# Patient Record
Sex: MALE | Race: WHITE | Employment: FULL TIME | URBAN - NONMETROPOLITAN AREA
[De-identification: names, ages, dates, MRNs, and addresses within clinical notes are randomized per-mention and may not be internally consistent; named-entity substitution may affect disease eponyms.]

---

## 2020-07-13 ENCOUNTER — HOSPITAL ENCOUNTER (OUTPATIENT)
Dept: GENERAL RADIOLOGY | Facility: OTHER | Age: 40
End: 2020-07-13
Attending: FAMILY MEDICINE
Payer: COMMERCIAL

## 2020-07-13 ENCOUNTER — OFFICE VISIT (OUTPATIENT)
Dept: FAMILY MEDICINE | Facility: OTHER | Age: 40
End: 2020-07-13
Attending: FAMILY MEDICINE
Payer: COMMERCIAL

## 2020-07-13 VITALS
HEART RATE: 68 BPM | HEIGHT: 73 IN | TEMPERATURE: 98 F | WEIGHT: 296 LBS | BODY MASS INDEX: 39.23 KG/M2 | RESPIRATION RATE: 16 BRPM | OXYGEN SATURATION: 98 % | DIASTOLIC BLOOD PRESSURE: 88 MMHG | SYSTOLIC BLOOD PRESSURE: 128 MMHG

## 2020-07-13 DIAGNOSIS — S83.241D TEAR OF MEDIAL MENISCUS OF RIGHT KNEE, CURRENT, UNSPECIFIED TEAR TYPE, SUBSEQUENT ENCOUNTER: ICD-10-CM

## 2020-07-13 DIAGNOSIS — M79.672 INTRACTABLE LEFT HEEL PAIN: ICD-10-CM

## 2020-07-13 DIAGNOSIS — G89.29 CHRONIC PAIN OF RIGHT KNEE: Primary | ICD-10-CM

## 2020-07-13 DIAGNOSIS — G89.29 CHRONIC PAIN OF RIGHT KNEE: ICD-10-CM

## 2020-07-13 DIAGNOSIS — M25.561 CHRONIC PAIN OF RIGHT KNEE: Primary | ICD-10-CM

## 2020-07-13 DIAGNOSIS — M25.561 CHRONIC PAIN OF RIGHT KNEE: ICD-10-CM

## 2020-07-13 PROCEDURE — 73560 X-RAY EXAM OF KNEE 1 OR 2: CPT | Mod: LT

## 2020-07-13 PROCEDURE — 73650 X-RAY EXAM OF HEEL: CPT | Mod: LT

## 2020-07-13 PROCEDURE — 99213 OFFICE O/P EST LOW 20 MIN: CPT | Performed by: FAMILY MEDICINE

## 2020-07-13 ASSESSMENT — PAIN SCALES - GENERAL: PAINLEVEL: NO PAIN (0)

## 2020-07-13 ASSESSMENT — MIFFLIN-ST. JEOR: SCORE: 2306.53

## 2020-07-13 NOTE — PROGRESS NOTES
"SUBJECTIVE:  Mik Mckeon is a 39 year old male here for 2 concerns.  First of all developed left heel pain last summer when he was playing softball.  It has never really gotten much better since then.  He generally has pain right away in the morning that will improve as he becomes more active throughout the day.  If he ever has periods of inactivity will stiffen up on him once again.  He describes the pain as being in his posterior calcaneus and is tender to touch.  He tried a stretching regimen for approximate 3 to 4 months last year without any significant improvement.    In regards to his right knee he has had this pain since last October.  He states that he is walking in the woods when he slipped on some rocks and had a twisting action.  He had immediate pain.  The pain seems to bother him most with twisting activities, jumping.  He is generally able to run without any difficulty.  He has tried both ice and anti-inflammatories without any success.    ROS:    As above otherwise ROS is unremarkable.    OBJECTIVE:  /88   Pulse 68   Temp 98  F (36.7  C)   Resp 16   Ht 1.854 m (6' 1\")   Wt 134.3 kg (296 lb)   SpO2 98%   BMI 39.05 kg/m      EXAM:  General Appearance: Pleasant, alert, appropriate appearance for age. No acute distress  Musculoskeletal: Right knee shows range of motion from 0 to 90 degrees with crepitus on extension.  He has medial joint space tenderness.  Medial pain with Almita's.  Normal varus, valgus, anterior drawer and Lockman's.      Left heel shows tenderness to palpation over his Achilles insertion site.  No retrocalcaneal tenderness.  No tenderness over the base of his calcaneus.  Normal range of motion of his ankle.  Skin: No erythema or bruising.  Neurologic Exam: Normal distal sensation to light touch.    ASSESSEMENT AND PLAN:    1. Chronic pain of right knee    2. Intractable left heel pain      X-ray of his right knee and left heel was performed, person reviewed.  His " calcaneus shows a significant bone spur near his Achilles insertion.  Discussed stretches, arch support, anti-inflammatories and icing    In regards to his right knee no significant bony abnormalities.  His medial meniscus for pathology.  Xexam and symptoms are consistent with medial meniscal tear.  We discussed options and at this time we will proceed with an MRI to evaluate his medial meniscus.  He will follow-up afterwards to discuss results.    Priyank Barbour MD    This document was prepared using voice generated software.  While every attempt was made for accuracy, grammatical errors may exist.

## 2020-07-13 NOTE — NURSING NOTE
Patient presents today for right knee and left heel pain.  Medication Reconciliation Complete    Priscilla Andrade LPN  7/13/2020 3:14 PM

## 2020-07-21 ENCOUNTER — HOSPITAL ENCOUNTER (OUTPATIENT)
Dept: MRI IMAGING | Facility: OTHER | Age: 40
Discharge: HOME | End: 2020-07-21
Attending: FAMILY MEDICINE | Admitting: FAMILY MEDICINE
Payer: COMMERCIAL

## 2020-07-21 DIAGNOSIS — M25.561 CHRONIC PAIN OF RIGHT KNEE: ICD-10-CM

## 2020-07-21 DIAGNOSIS — G89.29 CHRONIC PAIN OF RIGHT KNEE: ICD-10-CM

## 2020-07-21 PROCEDURE — 73721 MRI JNT OF LWR EXTRE W/O DYE: CPT | Mod: RT

## 2020-08-05 ENCOUNTER — OFFICE VISIT (OUTPATIENT)
Dept: ORTHOPEDICS | Facility: OTHER | Age: 40
End: 2020-08-05
Attending: FAMILY MEDICINE
Payer: COMMERCIAL

## 2020-08-05 VITALS
HEART RATE: 60 BPM | BODY MASS INDEX: 38.52 KG/M2 | WEIGHT: 292 LBS | DIASTOLIC BLOOD PRESSURE: 92 MMHG | SYSTOLIC BLOOD PRESSURE: 150 MMHG

## 2020-08-05 DIAGNOSIS — S83.241D TEAR OF MEDIAL MENISCUS OF RIGHT KNEE, CURRENT, UNSPECIFIED TEAR TYPE, SUBSEQUENT ENCOUNTER: ICD-10-CM

## 2020-08-05 PROCEDURE — 99202 OFFICE O/P NEW SF 15 MIN: CPT | Performed by: ORTHOPAEDIC SURGERY

## 2020-08-05 NOTE — PROGRESS NOTES
Patient is here for consult on his right knee pain.  Elva Shepard LPN .....................8/5/2020 3:03 PM

## 2020-08-05 NOTE — PROGRESS NOTES
Visit Date:   08/05/2020      REASON FOR EVALUATION:  Right knee pain.      HISTORY:  Mik comes in with regards to his right knee.  He had an injury here while doing some hunting back in October.  It has been painful since that time.  He is a very active gentleman here, but his pain has continued to cause him issues and not let things settle down.  He is here to look at options at this point in time.  He has pain with twisting and rotating, pain along the medial aspect.  Had MRI scan done here recently, which did show a posterior medial meniscal tear plus some underlying arthrosis, but nothing that is beyond mild or approaching moderate at this point in time.  A small popliteal cyst is seen there as well.  He does have some degenerative changes in the back side of the kneecap as well.  As reported, it is worse with activity, a little bit better with rest.      MEDICATIONS:  None.      ALLERGIES:  NO KNOWN DRUG ALLERGIES.      REVIEW OF SYSTEMS:  A 12-point otherwise negative with the exception as stated above.      PHYSICAL EXAMINATION:  The patient is 6 feet 1 inch, 296 pounds.  He is alert and oriented x3, cooperative and pleasant with exam, in no acute distress.  Does ambulate with satisfactory gait.  Affect appropriate.  Examination of patient's right knee shows motion 0-125.  Tenderness across the medial joint line.  Pain with Almita testing.  Stable to varus and valgus.  Lachman testing is negative here as well.  Kneecap tracking is acceptable.  Trace effusion seen in the joint.  Quad strength 5/5.  No skin lesions seen.  No associated hip pain with hip range of motion or straight leg raise otherwise.  MRI scan reviewed does show a small medial meniscus tear in posterior horn with underlying mild arthrosis medial compartment as well as patellofemoral joint.      IMPRESSION:  Right knee symptomatic meniscal tear in posterior horn with underlying arthrosis, mild in nature.      PLAN:  At this time, I advised  the patient to proceed for knee arthroscopy, partial meniscectomy and chondroplasty.  Will look at intervention here in the near future, potentially early September or thereabouts.  I will have schedules here work on a plan for him.  Preoperative exam with primary care physician, Dr. Barbour, and then further treatment as indicated.         JOSE ANGEL CHIU MD             D: 2020   T: 2020   MT: CRISTIAN      Name:     JEREMY NICOLE   MRN:      [id number]        Account:      [id number]   :      1980           Visit Date:   2020      Document: [id number]

## 2020-08-13 PROBLEM — S83.241D TEAR OF MEDIAL MENISCUS OF RIGHT KNEE, CURRENT, UNSPECIFIED TEAR TYPE, SUBSEQUENT ENCOUNTER: Status: ACTIVE | Noted: 2020-08-13

## 2020-08-21 ENCOUNTER — OFFICE VISIT (OUTPATIENT)
Dept: FAMILY MEDICINE | Facility: OTHER | Age: 40
End: 2020-08-21
Attending: PHYSICIAN ASSISTANT
Payer: COMMERCIAL

## 2020-08-21 VITALS
SYSTOLIC BLOOD PRESSURE: 140 MMHG | BODY MASS INDEX: 38.3 KG/M2 | WEIGHT: 289 LBS | HEIGHT: 73 IN | TEMPERATURE: 97.9 F | OXYGEN SATURATION: 96 % | RESPIRATION RATE: 18 BRPM | DIASTOLIC BLOOD PRESSURE: 80 MMHG | HEART RATE: 68 BPM

## 2020-08-21 DIAGNOSIS — M25.561 CHRONIC PAIN OF RIGHT KNEE: ICD-10-CM

## 2020-08-21 DIAGNOSIS — S83.241D TEAR OF MEDIAL MENISCUS OF RIGHT KNEE, CURRENT, UNSPECIFIED TEAR TYPE, SUBSEQUENT ENCOUNTER: Primary | ICD-10-CM

## 2020-08-21 DIAGNOSIS — Z20.822 COVID-19 RULED OUT: ICD-10-CM

## 2020-08-21 DIAGNOSIS — G89.29 CHRONIC PAIN OF RIGHT KNEE: ICD-10-CM

## 2020-08-21 PROCEDURE — 99214 OFFICE O/P EST MOD 30 MIN: CPT | Performed by: PHYSICIAN ASSISTANT

## 2020-08-21 ASSESSMENT — PAIN SCALES - GENERAL: PAINLEVEL: NO PAIN (0)

## 2020-08-21 ASSESSMENT — MIFFLIN-ST. JEOR: SCORE: 2274.78

## 2020-08-21 NOTE — NURSING NOTE
"Chief Complaint   Patient presents with     Pre-Op Exam     right knee scope/ Dr. Brooks/ 09/01/20 / JANE   This patient presents today for a Preoperative exam for this procedure:    Date of Surgery:  09/01/20  Surgeon:  Dr. Brooks  Facility:  [facility]  Fax:  [phone]        Initial BP (!) 140/80 (BP Location: Right arm, Patient Position: Sitting, Cuff Size: Adult Large)   Pulse 68   Temp 97.9  F (36.6  C) (Temporal)   Resp 18   Ht 1.854 m (6' 1\")   Wt 131.1 kg (289 lb)   SpO2 96%   BMI 38.13 kg/m   Estimated body mass index is 38.13 kg/m  as calculated from the following:    Height as of this encounter: 1.854 m (6' 1\").    Weight as of this encounter: 131.1 kg (289 lb).  Medication Reconciliation: complete    Nasrin Waite LPN  "

## 2020-08-21 NOTE — H&P (VIEW-ONLY)
----------------- PREOPERATIVE EXAM ------------------    SUBJECTIVE:   August Mckeon is a 39 year old male here today 8/21/2020 for preoperative optimization due to right knee injury.    Date of Surgery: 9/1/2020  Type of Surgery: Right knee arthroscopy  Surgeon: Dr. Brooks  [facility]: [facility]    HPI: Right knee injury due to hunting in October last fall.  Painful since then. Unchanged since he saw the surgeon on 8/5/2020.  MRI performed on 7/21/2020 personally reviewed and shows tear of the medial meniscus and posterior horn.  Blood pressure slightly elevated the last 2 visits at 140/83 and 150/92 on 8/5/2020.  Suspect these are due to knee pain.    Surgeons most recent HPI personally reviewed and dated 8/5/2020:  Mik comes in with regards to his right knee.  He had an injury here while doing some hunting back in October.  It has been painful since that time.  He is a very active gentleman here, but his pain has continued to cause him issues and not let things settle down.  He is here to look at options at this point in time.  He has pain with twisting and rotating, pain along the medial aspect.  Had MRI scan done here recently, which did show a posterior medial meniscal tear plus some underlying arthrosis, but nothing that is beyond mild or approaching moderate at this point in time.  A small popliteal cyst is seen there as well.  He does have some degenerative changes in the back side of the kneecap as well.  As reported, it is worse with activity, a little bit better with rest.     PREOPERATIVE QUESTIONS/CONSIDERATIONS:  Functional Capacity: Independent   METS: Greater than 10 METS  (> 10 METS = excellent functional capacity)    Anticoagulation medications: No (see below for full list of current medications)    Steroid use for chronic condition > 30 days duration: No    Alcohol or illicit drug use: No    Smoking within 1 year: No    Problems with bleeding or anesthesia: No denies personal or family  history of bleeding tendencies, anesthesia complications, or other problems with surgery.    Obesity: No Body mass index is 38.13 kg/m .    Diabetes: No     Hypertension requiring medication: No, recent elevated readings suspected due to knee pain  BP Readings from Last 3 Encounters:   08/21/20 (!) 140/80   08/05/20 (!) 150/92   07/13/20 128/88       Ascites within 30 days prior to surgery: NO     CHF within 30 days prior to surgery: NO    Dyspnea: NO    History of COPD: NO    Dialysis: NO    Acute Renal Failure: NO     Obstructive sleep apnea: NO    Hypoalbuminemia: NO (ref. 3.4 to 5.4 g/dL)    No results found for: ALBUMIN    Risk for symptomatic anemia: NO     Possibility of Pregnancy: NO    Latex Allergy: NO    Pulmonary Status: NO new or unstable cardiopulmonary signs or symptoms.  Cardiac Status:  The ASCVD Risk score (Carolin CESPEDES Jr., et al., 2013) failed to calculate for the following reasons:    Cannot find a previous HDL lab    Cannot find a previous total cholesterol lab  Revised Cardiac Risk Index - [place]    1 point each for:     History of CVA/TIA NO    CHF NO    Ischemic cardiac disease NO    Renal insufficiency (Cr > 2.0 mg/dL) NO    Diabetes requiring insulin NO    Orthopedic, supra-inguinal vascular, intra-thoracic, or intra-abdominal surgical site YES    TOTOAL:  1 (0 - 1 = low risk; ? 2 = elevated risk)      Do you have a history of heart attack, stroke, stent, bypass or surgery on an artery in the head, neck, heart, or legs?  o No     Do you ever have any pain or discomfort in your chest?  o No     Do you have a history of  Heart Failure?  o No     Are you troubled by shortness of breath when: walking on the level, up a slight hill or at night?  o No     Do you currently have a cold, bronchitis or other respiratory infection?  o No     Do you have a cough, shortness of breath or wheezing?  o No     Do you sometimes get pains in the calves of your legs when you walk?  o No     Do you or anyone in  your family have previous history of blood clots?  o No     Do you or does anyone in your family have a serious bleeding problem such as prolonged bleeding following surgeries or cuts?  o No     Have you ever had problems with anemia or been told to take iron pills?  o No     Have you had any abnormal blood loss such as black, tarry or bloody stools, or abnormal vaginal bleeding?  o No     Have you ever had a blood transfusion?  o No     Have you or any of your relatives ever had problems with anesthesia?  o No     Do you have sleep apnea, excessive snoring or daytime drowsiness?  o No     Do you have any prosthetic heart valves?  o No     Do you have prosthetic joints?  o No     Is there any chance that you may be pregnant?  o No     Do you have a latex allergy?  o No     ACTIVE PROBLEM LIST:  Patient Active Problem List    Diagnosis Date Noted     Tear of medial meniscus of right knee, current, unspecified tear type, subsequent encounter 08/13/2020     Priority: Medium     Added automatically from request for surgery [id number]       S/P shoulder surgery 04/22/2015     Priority: Medium     AC separation 04/16/2015     Priority: Medium       PAST MEDICAL HISTORY:  Past Medical History:   Diagnosis Date     Closed dislocation of acromioclavicular joint     4/16/2015     Other postprocedural states     4/22/2015     Personal history of other medical treatment (CODE)     No Comments Provided       PAST SURGICAL HISTORY:  Past Surgical History:   Procedure Laterality Date     OTHER SURGICAL HISTORY      4/22/2015,[id number],OTHER,Right     OTHER SURGICAL HISTORY      4/22/2015,[id number],OTHER,Right,Arthrex equipment used       MEDICATIONS:  No current outpatient medications on file.       ALLERGIES:  No Known Allergies  Latex Allergy: NO     FAMILY HISTORY:  Family History   Problem Relation Age of Onset     Family History Negative Mother         Good Health     Diabetes Father         Diabetes     Cancer Other          "Cancer,FHx Lung cancer in 3 grandparents.       SOCIAL HISTORY:  Social History     Tobacco Use     Smoking status: Never Smoker     Smokeless tobacco: Never Used   Substance Use Topics     Alcohol use: None     Drug use: Never     Types: Other     Comment: Drug use: Not Asked       REVIEW OF SYSTEMS:  Constitutional: No fever, chills, night sweats, fatigue, or weight changes  Skin: No rashes, itching, bruising, dryness, changes in moles, or changes in hair  HEENT: No changes in vision, changes in hearing/tinnitus, nasal or sinus problems, hoarseness, or dry mouth  Pulm: No dyspnea, cough, wheezing, snoring, sputum, or hemoptysis  CV: No chest pain, palpitations, orthopnea, PND, edema, or claudication  GI: No nausea or vomiting, diarrhea, constipation, abdominal pain, rectal bleeding, dysphagia, or odynophagia  : No dysuria, nocturia, hesitancy, incontinence, or hematuria  Heme: No easy bruising/bleeding, or lymphadenopathy  Endo: No heat/cold intolerance, polyuria/polydipsia, or appetite changes  Musculoskeletal: Pain with twisting and rotating in the medial aspect of the right knee, no muscle pain, myalgias, or back pain  Neuro: No headache, dizziness, hearing loss, weakness, seizures, tremor, numbness, or tingling  Psychiatric: No anxiety, depression, or suicidal/homicidal ideation  Surgical: No previous complications from prior surgeries such as prolonged bleeding, anesthesia complications, dysrhythmias, surgical wound infections, or prolonged hospital stay.    OBJECTIVE:     PHYSICAL EXAM:  Vitals:  Vitals:    08/21/20 0857   BP: (!) 140/80   BP Location: Right arm   Patient Position: Sitting   Cuff Size: Adult Large   Pulse: 68   Resp: 18   Temp: 97.9  F (36.6  C)   TempSrc: Temporal   SpO2: 96%   Weight: 131.1 kg (289 lb)   Height: 1.854 m (6' 1\")       Exam:  General Appearance: Athletic build, Pleasant, alert, appropriate appearance for age. No acute distress  Head: Normal. Normocephalic, " atraumatic.  Eyes: PERRL, EOMI  Ears: Normal TM's bilaterally. Normal auditory canals and external ears.   OroPharynx: Normal buccal mucosa. Normal pharynx.  Neck: Supple, no masses, lymphadenopathy, or thyromegaly.  Pulmonary: Normal chest wall and respirations. Clear to auscultation, no wheezes or crackles.  Cardiovascular: Regular rate and rhythm. S1, S2, no murmurs.  Gastrointestinal: Soft, nontender, no abnormal masses, guarding, or rigidity.  No tympany.  BS normal  Musculoskeletal: Tenderness to palpation of the medial aspect of the joint line of the right knee with trace effusion, pain with Almita testing, varus and valgus testing stable, negative Lockman's, no pain with active or passive range of motion of the hip or ankle.  Skin: no concerning or new rashes.  Neurologic: Normal gait.  Symmetric DTRs, normal gross motor movement, tone, and coordination. No tremor.  Psychiatric: Alert and oriented, appropriate affect.    ASSESSMENT AND PLAN:     1. Tear of medial meniscus of right knee, current, unspecified tear type, subsequent encounter    2. COVID-19 ruled out    3. Chronic pain of right knee        Patient is medically optimized for surgery.    Recent elevated blood pressures suspected due to knee pain.    PRE OP RECOMMENDATIONS:  Patient is on chronic pain medications: no  Patient is on antiplatlet/anticoagulation: no  Other medications that need adjustment perioperatively: no    Patient was advised to call our office and the surgical services with any change in condition or new symptoms if they were to develop between today and their surgical date.  Especially any cardiopulmonary symptoms or symptoms concerning for an infection.    DANO Walls  Family Medicine  [facility]     This document was prepared using voice generated softwear.  While every attempt was made for accuracy, grammatical errors may exist.

## 2020-08-21 NOTE — PROGRESS NOTES
----------------- PREOPERATIVE EXAM ------------------    SUBJECTIVE:   August Mckeon is a 39 year old male here today 8/21/2020 for preoperative optimization due to right knee injury.    Date of Surgery: 9/1/2020  Type of Surgery: Right knee arthroscopy  Surgeon: Dr. Brooks  [facility]: [facility]    HPI: Right knee injury due to hunting in October last fall.  Painful since then. Unchanged since he saw the surgeon on 8/5/2020.  MRI performed on 7/21/2020 personally reviewed and shows tear of the medial meniscus and posterior horn.  Blood pressure slightly elevated the last 2 visits at 140/83 and 150/92 on 8/5/2020.  Suspect these are due to knee pain.    Surgeons most recent HPI personally reviewed and dated 8/5/2020:  Mik comes in with regards to his right knee.  He had an injury here while doing some hunting back in October.  It has been painful since that time.  He is a very active gentleman here, but his pain has continued to cause him issues and not let things settle down.  He is here to look at options at this point in time.  He has pain with twisting and rotating, pain along the medial aspect.  Had MRI scan done here recently, which did show a posterior medial meniscal tear plus some underlying arthrosis, but nothing that is beyond mild or approaching moderate at this point in time.  A small popliteal cyst is seen there as well.  He does have some degenerative changes in the back side of the kneecap as well.  As reported, it is worse with activity, a little bit better with rest.     PREOPERATIVE QUESTIONS/CONSIDERATIONS:  Functional Capacity: Independent   METS: Greater than 10 METS  (> 10 METS = excellent functional capacity)    Anticoagulation medications: No (see below for full list of current medications)    Steroid use for chronic condition > 30 days duration: No    Alcohol or illicit drug use: No    Smoking within 1 year: No    Problems with bleeding or anesthesia: No denies personal or family  history of bleeding tendencies, anesthesia complications, or other problems with surgery.    Obesity: No Body mass index is 38.13 kg/m .    Diabetes: No     Hypertension requiring medication: No, recent elevated readings suspected due to knee pain  BP Readings from Last 3 Encounters:   08/21/20 (!) 140/80   08/05/20 (!) 150/92   07/13/20 128/88       Ascites within 30 days prior to surgery: NO     CHF within 30 days prior to surgery: NO    Dyspnea: NO    History of COPD: NO    Dialysis: NO    Acute Renal Failure: NO     Obstructive sleep apnea: NO    Hypoalbuminemia: NO (ref. 3.4 to 5.4 g/dL)    No results found for: ALBUMIN    Risk for symptomatic anemia: NO     Possibility of Pregnancy: NO    Latex Allergy: NO    Pulmonary Status: NO new or unstable cardiopulmonary signs or symptoms.  Cardiac Status:  The ASCVD Risk score (Carolin CESPEDES Jr., et al., 2013) failed to calculate for the following reasons:    Cannot find a previous HDL lab    Cannot find a previous total cholesterol lab  Revised Cardiac Risk Index - Muir    1 point each for:     History of CVA/TIA NO    CHF NO    Ischemic cardiac disease NO    Renal insufficiency (Cr > 2.0 mg/dL) NO    Diabetes requiring insulin NO    Orthopedic, supra-inguinal vascular, intra-thoracic, or intra-abdominal surgical site YES    TOTOAL:  1 (0 - 1 = low risk; ? 2 = elevated risk)      Do you have a history of heart attack, stroke, stent, bypass or surgery on an artery in the head, neck, heart, or legs?  o No     Do you ever have any pain or discomfort in your chest?  o No     Do you have a history of  Heart Failure?  o No     Are you troubled by shortness of breath when: walking on the level, up a slight hill or at night?  o No     Do you currently have a cold, bronchitis or other respiratory infection?  o No     Do you have a cough, shortness of breath or wheezing?  o No     Do you sometimes get pains in the calves of your legs when you walk?  o No     Do you or anyone in  your family have previous history of blood clots?  o No     Do you or does anyone in your family have a serious bleeding problem such as prolonged bleeding following surgeries or cuts?  o No     Have you ever had problems with anemia or been told to take iron pills?  o No     Have you had any abnormal blood loss such as black, tarry or bloody stools, or abnormal vaginal bleeding?  o No     Have you ever had a blood transfusion?  o No     Have you or any of your relatives ever had problems with anesthesia?  o No     Do you have sleep apnea, excessive snoring or daytime drowsiness?  o No     Do you have any prosthetic heart valves?  o No     Do you have prosthetic joints?  o No     Is there any chance that you may be pregnant?  o No     Do you have a latex allergy?  o No     ACTIVE PROBLEM LIST:  Patient Active Problem List    Diagnosis Date Noted     Tear of medial meniscus of right knee, current, unspecified tear type, subsequent encounter 08/13/2020     Priority: Medium     Added automatically from request for surgery [id number]       S/P shoulder surgery 04/22/2015     Priority: Medium     AC separation 04/16/2015     Priority: Medium       PAST MEDICAL HISTORY:  Past Medical History:   Diagnosis Date     Closed dislocation of acromioclavicular joint     4/16/2015     Other postprocedural states     4/22/2015     Personal history of other medical treatment (CODE)     No Comments Provided       PAST SURGICAL HISTORY:  Past Surgical History:   Procedure Laterality Date     OTHER SURGICAL HISTORY      4/22/2015,[id number],OTHER,Right     OTHER SURGICAL HISTORY      4/22/2015,[id number],OTHER,Right,Arthrex equipment used       MEDICATIONS:  No current outpatient medications on file.       ALLERGIES:  No Known Allergies  Latex Allergy: NO     FAMILY HISTORY:  Family History   Problem Relation Age of Onset     Family History Negative Mother         Good Health     Diabetes Father         Diabetes     Cancer Other          "Cancer,FHx Lung cancer in 3 grandparents.       SOCIAL HISTORY:  Social History     Tobacco Use     Smoking status: Never Smoker     Smokeless tobacco: Never Used   Substance Use Topics     Alcohol use: None     Drug use: Never     Types: Other     Comment: Drug use: Not Asked       REVIEW OF SYSTEMS:  Constitutional: No fever, chills, night sweats, fatigue, or weight changes  Skin: No rashes, itching, bruising, dryness, changes in moles, or changes in hair  HEENT: No changes in vision, changes in hearing/tinnitus, nasal or sinus problems, hoarseness, or dry mouth  Pulm: No dyspnea, cough, wheezing, snoring, sputum, or hemoptysis  CV: No chest pain, palpitations, orthopnea, PND, edema, or claudication  GI: No nausea or vomiting, diarrhea, constipation, abdominal pain, rectal bleeding, dysphagia, or odynophagia  : No dysuria, nocturia, hesitancy, incontinence, or hematuria  Heme: No easy bruising/bleeding, or lymphadenopathy  Endo: No heat/cold intolerance, polyuria/polydipsia, or appetite changes  Musculoskeletal: Pain with twisting and rotating in the medial aspect of the right knee, no muscle pain, myalgias, or back pain  Neuro: No headache, dizziness, hearing loss, weakness, seizures, tremor, numbness, or tingling  Psychiatric: No anxiety, depression, or suicidal/homicidal ideation  Surgical: No previous complications from prior surgeries such as prolonged bleeding, anesthesia complications, dysrhythmias, surgical wound infections, or prolonged hospital stay.    OBJECTIVE:     PHYSICAL EXAM:  Vitals:  Vitals:    08/21/20 0857   BP: (!) 140/80   BP Location: Right arm   Patient Position: Sitting   Cuff Size: Adult Large   Pulse: 68   Resp: 18   Temp: 97.9  F (36.6  C)   TempSrc: Temporal   SpO2: 96%   Weight: 131.1 kg (289 lb)   Height: 1.854 m (6' 1\")       Exam:  General Appearance: Athletic build, Pleasant, alert, appropriate appearance for age. No acute distress  Head: Normal. Normocephalic, " atraumatic.  Eyes: PERRL, EOMI  Ears: Normal TM's bilaterally. Normal auditory canals and external ears.   OroPharynx: Normal buccal mucosa. Normal pharynx.  Neck: Supple, no masses, lymphadenopathy, or thyromegaly.  Pulmonary: Normal chest wall and respirations. Clear to auscultation, no wheezes or crackles.  Cardiovascular: Regular rate and rhythm. S1, S2, no murmurs.  Gastrointestinal: Soft, nontender, no abnormal masses, guarding, or rigidity.  No tympany.  BS normal  Musculoskeletal: Tenderness to palpation of the medial aspect of the joint line of the right knee with trace effusion, pain with Almita testing, varus and valgus testing stable, negative Lockman's, no pain with active or passive range of motion of the hip or ankle.  Skin: no concerning or new rashes.  Neurologic: Normal gait.  Symmetric DTRs, normal gross motor movement, tone, and coordination. No tremor.  Psychiatric: Alert and oriented, appropriate affect.    ASSESSMENT AND PLAN:     1. Tear of medial meniscus of right knee, current, unspecified tear type, subsequent encounter    2. COVID-19 ruled out    3. Chronic pain of right knee        Patient is medically optimized for surgery.    Recent elevated blood pressures suspected due to knee pain.    PRE OP RECOMMENDATIONS:  Patient is on chronic pain medications: no  Patient is on antiplatlet/anticoagulation: no  Other medications that need adjustment perioperatively: no    Patient was advised to call our office and the surgical services with any change in condition or new symptoms if they were to develop between today and their surgical date.  Especially any cardiopulmonary symptoms or symptoms concerning for an infection.    DANO Walls  Family Medicine  [facility]     This document was prepared using voice generated softwear.  While every attempt was made for accuracy, grammatical errors may exist.

## 2020-08-29 ENCOUNTER — ALLIED HEALTH/NURSE VISIT (OUTPATIENT)
Dept: FAMILY MEDICINE | Facility: OTHER | Age: 40
End: 2020-08-29
Attending: ORTHOPAEDIC SURGERY
Payer: COMMERCIAL

## 2020-08-29 DIAGNOSIS — Z20.822 COVID-19 RULED OUT: ICD-10-CM

## 2020-08-29 PROCEDURE — 99207 ZZC NO CHARGE NURSE ONLY: CPT

## 2020-08-29 PROCEDURE — U0003 INFECTIOUS AGENT DETECTION BY NUCLEIC ACID (DNA OR RNA); SEVERE ACUTE RESPIRATORY SYNDROME CORONAVIRUS 2 (SARS-COV-2) (CORONAVIRUS DISEASE [COVID-19]), AMPLIFIED PROBE TECHNIQUE, MAKING USE OF HIGH THROUGHPUT TECHNOLOGIES AS DESCRIBED BY CMS-2020-01-R: HCPCS | Mod: ZL | Performed by: PHYSICIAN ASSISTANT

## 2020-08-29 PROCEDURE — C9803 HOPD COVID-19 SPEC COLLECT: HCPCS

## 2020-08-30 LAB
SARS-COV-2 RNA SPEC QL NAA+PROBE: NOT DETECTED
SPECIMEN SOURCE: NORMAL

## 2020-08-31 ENCOUNTER — ANESTHESIA EVENT (OUTPATIENT)
Dept: SURGERY | Facility: OTHER | Age: 40
End: 2020-08-31
Payer: COMMERCIAL

## 2020-09-01 ENCOUNTER — HOSPITAL ENCOUNTER (OUTPATIENT)
Facility: OTHER | Age: 40
Discharge: HOME | End: 2020-09-01
Attending: ORTHOPAEDIC SURGERY | Admitting: ORTHOPAEDIC SURGERY
Payer: COMMERCIAL

## 2020-09-01 ENCOUNTER — ANESTHESIA (OUTPATIENT)
Dept: SURGERY | Facility: OTHER | Age: 40
End: 2020-09-01
Payer: COMMERCIAL

## 2020-09-01 VITALS
OXYGEN SATURATION: 98 % | SYSTOLIC BLOOD PRESSURE: 132 MMHG | HEART RATE: 54 BPM | DIASTOLIC BLOOD PRESSURE: 81 MMHG | RESPIRATION RATE: 14 BRPM | WEIGHT: 289 LBS | BODY MASS INDEX: 38.3 KG/M2 | HEIGHT: 73 IN | TEMPERATURE: 97.1 F

## 2020-09-01 DIAGNOSIS — S83.241D TEAR OF MEDIAL MENISCUS OF RIGHT KNEE, CURRENT, UNSPECIFIED TEAR TYPE, SUBSEQUENT ENCOUNTER: ICD-10-CM

## 2020-09-01 PROCEDURE — 29881 ARTHRS KNE SRG MNISECTMY M/L: CPT | Performed by: NURSE ANESTHETIST, CERTIFIED REGISTERED

## 2020-09-01 PROCEDURE — 71000014 ZZH RECOVERY PHASE 1 LEVEL 2 FIRST HR: Performed by: ORTHOPAEDIC SURGERY

## 2020-09-01 PROCEDURE — 37000008 ZZH ANESTHESIA TECHNICAL FEE, 1ST 30 MIN: Performed by: ORTHOPAEDIC SURGERY

## 2020-09-01 PROCEDURE — 29875 ARTHRS KNEE SURG SYNVCT LMTD: CPT | Mod: XS | Performed by: ORTHOPAEDIC SURGERY

## 2020-09-01 PROCEDURE — 25800025 ZZH RX 258: Performed by: ORTHOPAEDIC SURGERY

## 2020-09-01 PROCEDURE — 36000056 ZZH SURGERY LEVEL 3 1ST 30 MIN: Performed by: ORTHOPAEDIC SURGERY

## 2020-09-01 PROCEDURE — 71000027 ZZH RECOVERY PHASE 2 EACH 15 MINS: Performed by: ORTHOPAEDIC SURGERY

## 2020-09-01 PROCEDURE — 29881 ARTHRS KNE SRG MNISECTMY M/L: CPT | Mod: RT | Performed by: ORTHOPAEDIC SURGERY

## 2020-09-01 PROCEDURE — 25000128 H RX IP 250 OP 636: Performed by: ORTHOPAEDIC SURGERY

## 2020-09-01 PROCEDURE — 37000009 ZZH ANESTHESIA TECHNICAL FEE, EACH ADDTL 15 MIN: Performed by: ORTHOPAEDIC SURGERY

## 2020-09-01 PROCEDURE — 40000306 ZZH STATISTIC PRE PROC ASSESS II: Performed by: ORTHOPAEDIC SURGERY

## 2020-09-01 PROCEDURE — 25800030 ZZH RX IP 258 OP 636: Performed by: NURSE ANESTHETIST, CERTIFIED REGISTERED

## 2020-09-01 PROCEDURE — 36000058 ZZH SURGERY LEVEL 3 EA 15 ADDTL MIN: Performed by: ORTHOPAEDIC SURGERY

## 2020-09-01 PROCEDURE — 25000128 H RX IP 250 OP 636: Performed by: NURSE ANESTHETIST, CERTIFIED REGISTERED

## 2020-09-01 PROCEDURE — 27210794 ZZH OR GENERAL SUPPLY STERILE: Performed by: ORTHOPAEDIC SURGERY

## 2020-09-01 PROCEDURE — 25000125 ZZHC RX 250: Performed by: NURSE ANESTHETIST, CERTIFIED REGISTERED

## 2020-09-01 RX ORDER — ONDANSETRON 2 MG/ML
INJECTION INTRAMUSCULAR; INTRAVENOUS PRN
Status: DISCONTINUED | OUTPATIENT
Start: 2020-09-01 | End: 2020-09-01

## 2020-09-01 RX ORDER — MEPERIDINE HYDROCHLORIDE 50 MG/ML
12.5 INJECTION INTRAMUSCULAR; INTRAVENOUS; SUBCUTANEOUS
Status: DISCONTINUED | OUTPATIENT
Start: 2020-09-01 | End: 2020-09-01 | Stop reason: HOSPADM

## 2020-09-01 RX ORDER — FENTANYL CITRATE 50 UG/ML
25-50 INJECTION, SOLUTION INTRAMUSCULAR; INTRAVENOUS EVERY 5 MIN PRN
Status: DISCONTINUED | OUTPATIENT
Start: 2020-09-01 | End: 2020-09-01 | Stop reason: HOSPADM

## 2020-09-01 RX ORDER — LIDOCAINE 40 MG/G
CREAM TOPICAL
Status: DISCONTINUED | OUTPATIENT
Start: 2020-09-01 | End: 2020-09-01 | Stop reason: HOSPADM

## 2020-09-01 RX ORDER — CEFAZOLIN SODIUM 1 G/3ML
1 INJECTION, POWDER, FOR SOLUTION INTRAMUSCULAR; INTRAVENOUS SEE ADMIN INSTRUCTIONS
Status: DISCONTINUED | OUTPATIENT
Start: 2020-09-01 | End: 2020-09-01 | Stop reason: HOSPADM

## 2020-09-01 RX ORDER — ONDANSETRON 2 MG/ML
4 INJECTION INTRAMUSCULAR; INTRAVENOUS EVERY 30 MIN PRN
Status: DISCONTINUED | OUTPATIENT
Start: 2020-09-01 | End: 2020-09-01 | Stop reason: HOSPADM

## 2020-09-01 RX ORDER — HYDROCODONE BITARTRATE AND ACETAMINOPHEN 5; 325 MG/1; MG/1
1 TABLET ORAL EVERY 6 HOURS PRN
Status: DISCONTINUED | OUTPATIENT
Start: 2020-09-01 | End: 2020-09-01 | Stop reason: HOSPADM

## 2020-09-01 RX ORDER — LIDOCAINE HYDROCHLORIDE 20 MG/ML
INJECTION, SOLUTION INFILTRATION; PERINEURAL PRN
Status: DISCONTINUED | OUTPATIENT
Start: 2020-09-01 | End: 2020-09-01

## 2020-09-01 RX ORDER — FENTANYL CITRATE 50 UG/ML
INJECTION, SOLUTION INTRAMUSCULAR; INTRAVENOUS PRN
Status: DISCONTINUED | OUTPATIENT
Start: 2020-09-01 | End: 2020-09-01

## 2020-09-01 RX ORDER — HYDROMORPHONE HYDROCHLORIDE 1 MG/ML
.3-.5 INJECTION, SOLUTION INTRAMUSCULAR; INTRAVENOUS; SUBCUTANEOUS EVERY 10 MIN PRN
Status: DISCONTINUED | OUTPATIENT
Start: 2020-09-01 | End: 2020-09-01 | Stop reason: HOSPADM

## 2020-09-01 RX ORDER — PROPOFOL 10 MG/ML
INJECTION, EMULSION INTRAVENOUS PRN
Status: DISCONTINUED | OUTPATIENT
Start: 2020-09-01 | End: 2020-09-01

## 2020-09-01 RX ORDER — BUPIVACAINE HYDROCHLORIDE 2.5 MG/ML
INJECTION, SOLUTION EPIDURAL; INFILTRATION; INTRACAUDAL PRN
Status: DISCONTINUED | OUTPATIENT
Start: 2020-09-01 | End: 2020-09-01 | Stop reason: HOSPADM

## 2020-09-01 RX ORDER — DEXAMETHASONE SODIUM PHOSPHATE 4 MG/ML
INJECTION, SOLUTION INTRA-ARTICULAR; INTRALESIONAL; INTRAMUSCULAR; INTRAVENOUS; SOFT TISSUE PRN
Status: DISCONTINUED | OUTPATIENT
Start: 2020-09-01 | End: 2020-09-01

## 2020-09-01 RX ORDER — SODIUM CHLORIDE, SODIUM LACTATE, POTASSIUM CHLORIDE, CALCIUM CHLORIDE 600; 310; 30; 20 MG/100ML; MG/100ML; MG/100ML; MG/100ML
INJECTION, SOLUTION INTRAVENOUS CONTINUOUS
Status: DISCONTINUED | OUTPATIENT
Start: 2020-09-01 | End: 2020-09-01 | Stop reason: HOSPADM

## 2020-09-01 RX ORDER — CEFAZOLIN SODIUM IN 0.9 % NACL 3 G/100 ML
3 INTRAVENOUS SOLUTION, PIGGYBACK (ML) INTRAVENOUS
Status: COMPLETED | OUTPATIENT
Start: 2020-09-01 | End: 2020-09-01

## 2020-09-01 RX ORDER — KETOROLAC TROMETHAMINE 30 MG/ML
INJECTION, SOLUTION INTRAMUSCULAR; INTRAVENOUS PRN
Status: DISCONTINUED | OUTPATIENT
Start: 2020-09-01 | End: 2020-09-01

## 2020-09-01 RX ORDER — NALOXONE HYDROCHLORIDE 0.4 MG/ML
.1-.4 INJECTION, SOLUTION INTRAMUSCULAR; INTRAVENOUS; SUBCUTANEOUS
Status: DISCONTINUED | OUTPATIENT
Start: 2020-09-01 | End: 2020-09-01 | Stop reason: HOSPADM

## 2020-09-01 RX ORDER — HYDROCODONE BITARTRATE AND ACETAMINOPHEN 5; 325 MG/1; MG/1
1 TABLET ORAL EVERY 6 HOURS PRN
Qty: 10 TABLET | Refills: 0 | Status: SHIPPED | OUTPATIENT
Start: 2020-09-01 | End: 2020-09-04

## 2020-09-01 RX ORDER — ONDANSETRON 4 MG/1
4 TABLET, ORALLY DISINTEGRATING ORAL EVERY 30 MIN PRN
Status: DISCONTINUED | OUTPATIENT
Start: 2020-09-01 | End: 2020-09-01 | Stop reason: HOSPADM

## 2020-09-01 RX ORDER — KETAMINE HYDROCHLORIDE 10 MG/ML
INJECTION INTRAMUSCULAR; INTRAVENOUS PRN
Status: DISCONTINUED | OUTPATIENT
Start: 2020-09-01 | End: 2020-09-01

## 2020-09-01 RX ORDER — PROPOFOL 10 MG/ML
INJECTION, EMULSION INTRAVENOUS CONTINUOUS PRN
Status: DISCONTINUED | OUTPATIENT
Start: 2020-09-01 | End: 2020-09-01

## 2020-09-01 RX ADMIN — DEXAMETHASONE SODIUM PHOSPHATE 8 MG: 4 INJECTION, SOLUTION INTRA-ARTICULAR; INTRALESIONAL; INTRAMUSCULAR; INTRAVENOUS; SOFT TISSUE at 09:14

## 2020-09-01 RX ADMIN — ONDANSETRON 4 MG: 2 INJECTION INTRAMUSCULAR; INTRAVENOUS at 09:01

## 2020-09-01 RX ADMIN — PROPOFOL 200 MCG/KG/MIN: 10 INJECTION, EMULSION INTRAVENOUS at 09:02

## 2020-09-01 RX ADMIN — PROPOFOL 200 MG: 10 INJECTION, EMULSION INTRAVENOUS at 09:01

## 2020-09-01 RX ADMIN — KETOROLAC TROMETHAMINE 30 MG: 30 INJECTION, SOLUTION INTRAMUSCULAR at 09:37

## 2020-09-01 RX ADMIN — FENTANYL CITRATE 25 MCG: 50 INJECTION, SOLUTION INTRAMUSCULAR; INTRAVENOUS at 09:35

## 2020-09-01 RX ADMIN — FENTANYL CITRATE 25 MCG: 50 INJECTION, SOLUTION INTRAMUSCULAR; INTRAVENOUS at 09:38

## 2020-09-01 RX ADMIN — Medication 50 MG: at 09:06

## 2020-09-01 RX ADMIN — SODIUM CHLORIDE, POTASSIUM CHLORIDE, SODIUM LACTATE AND CALCIUM CHLORIDE: 600; 310; 30; 20 INJECTION, SOLUTION INTRAVENOUS at 07:41

## 2020-09-01 RX ADMIN — FENTANYL CITRATE 25 MCG: 50 INJECTION, SOLUTION INTRAMUSCULAR; INTRAVENOUS at 09:25

## 2020-09-01 RX ADMIN — Medication 3 G: at 08:53

## 2020-09-01 RX ADMIN — LIDOCAINE HYDROCHLORIDE 80 MG: 20 INJECTION, SOLUTION INFILTRATION; PERINEURAL at 09:01

## 2020-09-01 RX ADMIN — MIDAZOLAM 2 MG: 1 INJECTION INTRAMUSCULAR; INTRAVENOUS at 08:52

## 2020-09-01 RX ADMIN — FENTANYL CITRATE 25 MCG: 50 INJECTION, SOLUTION INTRAMUSCULAR; INTRAVENOUS at 09:08

## 2020-09-01 ASSESSMENT — MIFFLIN-ST. JEOR: SCORE: 2274.65

## 2020-09-01 NOTE — ANESTHESIA CARE TRANSFER NOTE
Patient: August Mckeon    Procedure(s):  MENISCECTOMY, KNEE, ARTHROSCOPIC    Diagnosis: Tear of medial meniscus of right knee, current, unspecified tear type, subsequent encounter [Z24.721J]  Diagnosis Additional Information: No value filed.    Anesthesia Type:   General     Note:  Airway :Face Mask  Patient transferred to:PACU  Handoff Report: Identifed the Patient, Identified the Reponsible Provider, Reviewed the pertinent medical history, Discussed the surgical course, Reviewed Intra-OP anesthesia mangement and issues during anesthesia, Set expectations for post-procedure period and Allowed opportunity for questions and acknowledgement of understanding      Vitals: (Last set prior to Anesthesia Care Transfer)    CRNA VITALS  9/1/2020 0921 - 9/1/2020 0954      9/1/2020             Resp Rate (observed):  (!) 2                Electronically Signed By: LIANNA SMITH CRNA  September 1, 2020  9:54 AM

## 2020-09-01 NOTE — INTERVAL H&P NOTE
Brief History of Illness:   August Mckeon is a 39 year old male who was admitted for right knee pain    H&P reviewed and patient examined with no new updates from prior exam

## 2020-09-01 NOTE — ANESTHESIA PROCEDURE NOTES
Airway   Date/Time: 9/1/2020 9:03 AM   Patient location during procedure: OR    General Information and Staff   Performed: CRNA         Indications and Patient Condition  Indications for airway management: carter-procedural  Induction type:intravenous  Mask difficulty assessment: easy    Final Airway Details  Final airway type: supraglottic airway  Successful airway:LMA  Supraglottic airway: Size 5    Number of attempts at approach: 1  Number of other approaches attempted: 0      Ease of procedure: easy

## 2020-09-01 NOTE — OP NOTE
Procedure Date: 09/01/2020      PREOPERATIVE DIAGNOSIS:  Right knee displaced medial meniscus tear.      POSTOPERATIVE DIAGNOSES:     1.  Right knee displaced medial meniscus tear.   2.  Plica band.      PROCEDURES PERFORMED:   1.  Right knee arthroscopy with partial medial meniscectomy.   2.  Plica band resection.      ASSISTANT:  Peter Garcia PA-C      ANESTHESIA:  LMA.      COMPLICATIONS:  Without.      INDICATIONS FOR PROCEDURE:  Mr. Mckeon is a 39-year-old gentleman with history of right knee pain, progressive in nature, unresponsive to conservative measures.  Recommendation was for knee arthroscopy, partial meniscectomy and indicated procedure.  The patient did elect to proceed following a discussion of the risks and benefits.      DESCRIPTION OF PROCEDURE:  The patient was taken to the operative suite, administered anesthesia.  Following induction, placed in the standard arthroscopy position supine in nature.  Right lower extremity prepped and draped in normal sterile fashion.  Preoperative antibiotics administered.  Timeout was called.  At this point in time, anteromedial and anterolateral portals established.  Diagnostic arthroscopy was performed.  Medial compartment demonstrated posteromedial meniscus tear, flap-like in nature.  Meniscal biters as well as a shaver were used to trim back to smooth and contour margins, roughly 25% resected.  After completion of resection, inspected the cartilage, really no significant chondromalacia identified in the medial compartment.  ACL was intact.  Lateral compartment demonstrated no evidence of disease.  Patellofemoral joint did show some grade 2 chondromalacia.  We did a chondroplasty there with the arthroscopic shaver.  We inspected the medial and lateral gutters.  The medial gutter did show a very large plica band.  As such, we did resect this back to smooth margins as well.  We did a final inspection on the medial side and did not see any other remaining issues  there.  As such, the scope was withdrawn.  The portals were closed with nylon in interrupted fashion, followed by application of a soft postsurgical dressing.  The patient was then extubated and transferred to recovery room in stable condition.      POSTOPERATIVE PLAN:  Sutures out in 7-10 days.  Weightbear as tolerated.  Ice, elevate, quad exercise regimen.  Norco for pain management.      A skilled first assistant was necessary for position, intraoperative decision making, retraction and exposure during the procedure.      Furthermore, a skilled first assistant was necessary to complete the procedure in a technically safe and efficient manner.         JOSE ANGEL CHIU MD             D: 2020   T: 2020   MT: LAXMI      Name:     JOSE ANGEL NICOLE   MRN:      [id number]        Account:        [id number]   :      1980           Procedure Date: 2020      Document: [id number]

## 2020-09-01 NOTE — BRIEF OP NOTE
[facility]    Brief Operative Note    Pre-operative diagnosis: Tear of medial meniscus of right knee, current, unspecified tear type, subsequent encounter [C72.315B]  Post-operative diagnosis Same as pre-operative diagnosis    Procedure: Procedure(s):  MENISCECTOMY, KNEE, ARTHROSCOPIC  Surgeon: Surgeon(s) and Role:     * August Brooks MD - Primary     * Peter Garcia PA-C - Assisting  Anesthesia: General   Estimated blood loss: Minimal  Drains: None  Specimens: * No specimens in log *  Findings:   None.  Complications: None.  Implants: * No implants in log *

## 2020-09-01 NOTE — DISCHARGE INSTRUCTIONS
[facility]  Adult Discharge Orders & Instructions      For 24 hours after surgery:  1. Get plenty of rest.  A responsible adult must stay with you for at least 24 hours after you leave the hospital.   2. You may feel lightheaded.  IF so, sit for a few minutes before standing.  Have someone help you get up.   3. You may have a slight fever. Call the doctor if your fever is over 101 F (38.3 C) (taken under the tongue) or lasts longer than 24 hours.  4. You may have a dry mouth, a sore throat, muscle aches or trouble sleeping.  These should go away after 24 hours.  5. Do not make important or legal decisions.  6.   Do not drive or use heavy equipment.  If you have weakness or tingling, don't drive or use heavy equipment until this feeling goes away.                                                                                                                                                                           Surgeon Contact Information  If you have questions or concerns related to your procedure please contact your surgeon through [facility] at [phone].      Ice elevate RLE  Quad exercises  Crutche ambulation for one day then WBAT  May remove dressing POD#3 and apply bandaids, ok to shower POD#3  Followup 7-10 days at [facility]  Call [phone] for question postop

## 2020-09-01 NOTE — ANESTHESIA POSTPROCEDURE EVALUATION
Patient: August Mckeon    Procedure(s):  MENISCECTOMY, KNEE, ARTHROSCOPIC    Diagnosis:Tear of medial meniscus of right knee, current, unspecified tear type, subsequent encounter [F24.976Q]  Diagnosis Additional Information: No value filed.    Anesthesia Type:  General    Note:  Anesthesia Post Evaluation    Patient location during evaluation: Phase 2  Patient participation: Able to fully participate in evaluation  Level of consciousness: awake and alert  Pain management: adequate  Airway patency: patent  Cardiovascular status: acceptable  Respiratory status: acceptable  Hydration status: acceptable  PONV: none             Last vitals:  Vitals:    09/01/20 1010 09/01/20 1013 09/01/20 1014   BP: 121/74  120/77   Pulse: 58  56   Resp: 11  16   Temp: 97.1  F (36.2  C)     SpO2: 93% 93% 93%         Electronically Signed By: David Kellerman, APRN CRNA  September 1, 2020  11:42 AM

## 2020-09-01 NOTE — OR NURSING
PACU Respiratory Event Documentation     1) Episodes of Apnea greater than or equal to 10 seconds: no    2) Bradypnea - less than 8 breaths per minute: no    3) Pain score on 0 to 10 scale: denies    4) Pain-sedation mismatch (yes or no): no    5) Repeated 02 desaturation less than 90% (yes or no): no    Anesthesia notified? (yes or no): no    Any of the above events occuring repeatedly in separate 30 minute intervals may be considered recurrent PACU respiratory events.    Angella Johnson RN

## 2020-09-01 NOTE — ANESTHESIA PREPROCEDURE EVALUATION
Anesthesia Pre-Procedure Evaluation    Patient: August Mckeon   MRN: [id number] : 1980          Preoperative Diagnosis: Tear of medial meniscus of right knee, current, unspecified tear type, subsequent encounter [S83.329C]    Procedure(s):  MENISCECTOMY, KNEE, ARTHROSCOPIC    Past Medical History:   Diagnosis Date     Closed dislocation of acromioclavicular joint     2015     Other postprocedural states     2015     Personal history of other medical treatment (CODE)     No Comments Provided     Past Surgical History:   Procedure Laterality Date     OTHER SURGICAL HISTORY      2015,[id number],OTHER,Right     OTHER SURGICAL HISTORY      2015,[id number],OTHER,Right,Arthrex equipment used       Anesthesia Evaluation     . Pt has had prior anesthetic.     No history of anesthetic complications          ROS/MED HX    ENT/Pulmonary:  - neg pulmonary ROS     Neurologic:  - neg neurologic ROS     Cardiovascular:  - neg cardiovascular ROS       METS/Exercise Tolerance:  >4 METS   Hematologic:  - neg hematologic  ROS       Musculoskeletal: Comment: Shoulder surgery        GI/Hepatic: Comment: Occasional GERD, denies this am    (+) GERD Other,       Renal/Genitourinary:  - ROS Renal section negative       Endo:     (+) Obesity, .      Psychiatric:  - neg psychiatric ROS       Infectious Disease:  - neg infectious disease ROS       Malignancy:      - no malignancy   Other:    - neg other ROS                      Physical Exam  Normal systems: cardiovascular, pulmonary and dental    Airway   Mallampati: I  TM distance: >3 FB  Neck ROM: full    Dental   (+) implants  Comment: One permanent impant    Cardiovascular   Rhythm and rate: regular and normal      Pulmonary             No results found for: WBC, HGB, HCT, PLT, CRP, SED, NA, POTASSIUM, CHLORIDE, CO2, BUN, CR, GLC, ADRIEN, PHOS, MAG, ALBUMIN, PROTTOTAL, ALT, AST, GGT, ALKPHOS, BILITOTAL, BILIDIRECT, LIPASE, AMYLASE, ADDIS, PTT, INR, FIBR, TSH, T4, T3,  "HCG, HCGS, CKTOTAL, CKMB, TROPN    Preop Vitals  BP Readings from Last 3 Encounters:   09/01/20 (!) 141/91   08/21/20 (!) 140/80   08/05/20 (!) 150/92    Pulse Readings from Last 3 Encounters:   08/21/20 68   08/05/20 60   07/13/20 68      Resp Readings from Last 3 Encounters:   09/01/20 16   08/21/20 18   07/13/20 16    SpO2 Readings from Last 3 Encounters:   09/01/20 96%   08/21/20 96%   07/13/20 98%      Temp Readings from Last 1 Encounters:   09/01/20 97.6  F (36.4  C) (Tympanic)    Ht Readings from Last 1 Encounters:   09/01/20 1.854 m (6' 0.99\")      Wt Readings from Last 1 Encounters:   09/01/20 131.1 kg (289 lb)    Estimated body mass index is 38.14 kg/m  as calculated from the following:    Height as of this encounter: 1.854 m (6' 0.99\").    Weight as of this encounter: 131.1 kg (289 lb).       Anesthesia Plan      History & Physical Review      ASA Status:  2 .    NPO Status:  > 8 hours    Plan for General with Intravenous induction. Maintenance will be Balanced.    PONV prophylaxis:  Ondansetron (or other 5HT-3) and Dexamethasone or Solumedrol  Risks, benefits and alternatives discussed and would like to proceed.         Postoperative Care  Postoperative pain management:  IV analgesics and Multi-modal analgesia.      Consents  Anesthetic plan, risks, benefits and alternatives discussed with:  Patient and Spouse.  Use of blood products discussed: No .   .                 LIANNA Callejas CRNA  "

## 2020-09-09 ENCOUNTER — OFFICE VISIT (OUTPATIENT)
Dept: ORTHOPEDICS | Facility: OTHER | Age: 40
End: 2020-09-09
Attending: ORTHOPAEDIC SURGERY
Payer: COMMERCIAL

## 2020-09-09 DIAGNOSIS — S83.241D TEAR OF MEDIAL MENISCUS OF RIGHT KNEE, CURRENT, UNSPECIFIED TEAR TYPE, SUBSEQUENT ENCOUNTER: Primary | ICD-10-CM

## 2020-09-09 PROCEDURE — 99024 POSTOP FOLLOW-UP VISIT: CPT | Performed by: ORTHOPAEDIC SURGERY

## 2020-09-09 NOTE — PROGRESS NOTES
Visit Date:   2020      REASON FOR EVALUATION:  Right knee scope, partial meniscectomy.      HISTORY:  Jose Angel comes back after knee scope, partial meniscectomy.  Overall, reports doing well at this time, not having too much in the way of pain or discomfort.  Working through the healing process.  Feels much better after scope at this point in time.      PHYSICAL EXAMINATION:  Examination of the knee shows minimal swelling.  Neurovascular examination otherwise intact.  Incisional site has healed very well at this point in time.  Excellent range of motion, otherwise identified as well.      IMPRESSION:  Right knee arthroscopy, partial meniscectomy, plica resection.      PLAN:  Gradually increase activities as tolerated.  We will plan for a quad strengthening program.  If he is having any residual symptomatology in 3 or 4 weeks, certainly would advised followup at that point, but overall things are trending very well for him.  He is very pleased with the outcome.         JOSE ANGEL CHIU MD             D: 2020   T: 2020   MT: CRISTIAN      Name:     JOSE ANGEL NICOLE   MRN:      [id number]        Account:      [id number]   :      1980           Visit Date:   2020      Document: [id number]

## 2020-09-09 NOTE — PROGRESS NOTES
Patient is here for follow up on his right knee scope.  Elva Shepard LPN .....................9/9/2020 11:33 AM

## 2021-07-19 ENCOUNTER — OFFICE VISIT (OUTPATIENT)
Dept: FAMILY MEDICINE | Facility: OTHER | Age: 41
End: 2021-07-19
Attending: FAMILY MEDICINE
Payer: COMMERCIAL

## 2021-07-19 ENCOUNTER — HOSPITAL ENCOUNTER (OUTPATIENT)
Dept: GENERAL RADIOLOGY | Facility: OTHER | Age: 41
End: 2021-07-19
Attending: FAMILY MEDICINE
Payer: COMMERCIAL

## 2021-07-19 VITALS
OXYGEN SATURATION: 98 % | WEIGHT: 304.4 LBS | HEIGHT: 73 IN | HEART RATE: 60 BPM | BODY MASS INDEX: 40.34 KG/M2 | DIASTOLIC BLOOD PRESSURE: 108 MMHG | RESPIRATION RATE: 20 BRPM | TEMPERATURE: 98.5 F | SYSTOLIC BLOOD PRESSURE: 160 MMHG

## 2021-07-19 DIAGNOSIS — R07.1 CHEST PAIN ON BREATHING: Primary | ICD-10-CM

## 2021-07-19 DIAGNOSIS — I10 ESSENTIAL HYPERTENSION: ICD-10-CM

## 2021-07-19 DIAGNOSIS — E66.01 MORBID OBESITY (H): ICD-10-CM

## 2021-07-19 LAB
ALBUMIN SERPL-MCNC: 4.7 G/DL (ref 3.5–5.7)
ALP SERPL-CCNC: 35 U/L (ref 34–104)
ALT SERPL W P-5'-P-CCNC: 42 U/L (ref 7–52)
ANION GAP SERPL CALCULATED.3IONS-SCNC: 7 MMOL/L (ref 3–14)
AST SERPL W P-5'-P-CCNC: 27 U/L (ref 13–39)
BASOPHILS # BLD AUTO: 0 10E3/UL (ref 0–0.2)
BASOPHILS NFR BLD AUTO: 0 %
BILIRUB SERPL-MCNC: 0.7 MG/DL (ref 0.3–1)
BUN SERPL-MCNC: 12 MG/DL (ref 7–25)
CALCIUM SERPL-MCNC: 9.5 MG/DL (ref 8.6–10.3)
CHLORIDE BLD-SCNC: 101 MMOL/L (ref 98–107)
CO2 SERPL-SCNC: 27 MMOL/L (ref 21–31)
CREAT SERPL-MCNC: 1.09 MG/DL (ref 0.7–1.3)
CRP SERPL-MCNC: 9 MG/L
D DIMER PPP FEU-MCNC: <=0.27 UG/ML FEU (ref 0–0.5)
EOSINOPHIL # BLD AUTO: 0 10E3/UL (ref 0–0.7)
EOSINOPHIL NFR BLD AUTO: 0 %
ERYTHROCYTE [DISTWIDTH] IN BLOOD BY AUTOMATED COUNT: 12.5 % (ref 10–15)
ERYTHROCYTE [SEDIMENTATION RATE] IN BLOOD BY WESTERGREN METHOD: 5 MM/HR (ref 0–15)
GFR SERPL CREATININE-BSD FRML MDRD: 84 ML/MIN/1.73M2
GLUCOSE BLD-MCNC: 130 MG/DL (ref 70–105)
HCT VFR BLD AUTO: 44.7 % (ref 40–53)
HGB BLD-MCNC: 15.7 G/DL (ref 13.3–17.7)
HOLD SPECIMEN: NORMAL
IMM GRANULOCYTES # BLD: 0.1 10E3/UL
IMM GRANULOCYTES NFR BLD: 1 %
LYMPHOCYTES # BLD AUTO: 1.1 10E3/UL (ref 0.8–5.3)
LYMPHOCYTES NFR BLD AUTO: 8 %
MCH RBC QN AUTO: 29.3 PG (ref 26.5–33)
MCHC RBC AUTO-ENTMCNC: 35.1 G/DL (ref 31.5–36.5)
MCV RBC AUTO: 84 FL (ref 78–100)
MONOCYTES # BLD AUTO: 0.4 10E3/UL (ref 0–1.3)
MONOCYTES NFR BLD AUTO: 3 %
NEUTROPHILS # BLD AUTO: 11.8 10E3/UL (ref 1.6–8.3)
NEUTROPHILS NFR BLD AUTO: 88 %
NRBC # BLD AUTO: 0 10E3/UL
NRBC BLD AUTO-RTO: 0 /100
PLATELET # BLD AUTO: 215 10E3/UL (ref 150–450)
POTASSIUM BLD-SCNC: 4.1 MMOL/L (ref 3.5–5.1)
PROT SERPL-MCNC: 7.4 G/DL (ref 6.4–8.9)
RBC # BLD AUTO: 5.35 10E6/UL (ref 4.4–5.9)
SODIUM SERPL-SCNC: 135 MMOL/L (ref 134–144)
TROPONIN I SERPL-MCNC: 5.7 PG/ML (ref 0–34)
WBC # BLD AUTO: 13.5 10E3/UL (ref 4–11)

## 2021-07-19 PROCEDURE — 99214 OFFICE O/P EST MOD 30 MIN: CPT | Performed by: FAMILY MEDICINE

## 2021-07-19 PROCEDURE — 86140 C-REACTIVE PROTEIN: CPT | Mod: ZL | Performed by: FAMILY MEDICINE

## 2021-07-19 PROCEDURE — 85652 RBC SED RATE AUTOMATED: CPT | Mod: ZL | Performed by: FAMILY MEDICINE

## 2021-07-19 PROCEDURE — 85379 FIBRIN DEGRADATION QUANT: CPT | Mod: ZL | Performed by: FAMILY MEDICINE

## 2021-07-19 PROCEDURE — 71046 X-RAY EXAM CHEST 2 VIEWS: CPT

## 2021-07-19 PROCEDURE — 84484 ASSAY OF TROPONIN QUANT: CPT | Mod: ZL | Performed by: FAMILY MEDICINE

## 2021-07-19 PROCEDURE — 80053 COMPREHEN METABOLIC PANEL: CPT | Mod: ZL | Performed by: FAMILY MEDICINE

## 2021-07-19 PROCEDURE — 85025 COMPLETE CBC W/AUTO DIFF WBC: CPT | Mod: ZL | Performed by: FAMILY MEDICINE

## 2021-07-19 PROCEDURE — 93000 ELECTROCARDIOGRAM COMPLETE: CPT | Performed by: INTERNAL MEDICINE

## 2021-07-19 PROCEDURE — 36415 COLL VENOUS BLD VENIPUNCTURE: CPT | Mod: ZL | Performed by: FAMILY MEDICINE

## 2021-07-19 RX ORDER — CLONAZEPAM 0.5 MG/1
0.5 TABLET ORAL 2 TIMES DAILY PRN
Qty: 10 TABLET | Refills: 3 | Status: SHIPPED | OUTPATIENT
Start: 2021-07-19

## 2021-07-19 ASSESSMENT — MIFFLIN-ST. JEOR: SCORE: 2339.47

## 2021-07-19 ASSESSMENT — PAIN SCALES - GENERAL: PAINLEVEL: SEVERE PAIN (6)

## 2021-07-19 NOTE — PROGRESS NOTES
"SUBJECTIVE:  August Mckeon is a 40 year old male here for chest pain.  He reports that he has had chest pain, palpitations and generally just feeling poorly the started last night.  He was hoping that he could sleep it off.  He was unable to get any restful sleep last night.  He tried exercising this morning which she was able to complete but his symptoms have not resolved.  He has had no nausea or vomiting with this.  He reports that he had similar episode on July 14 that resolved on its own.  He is worried that this may be related to stress given the pressures he is having at work but also recognizes that this could be something more significant.  He does not check his blood pressure at home regularly.    Allergies:  No Known Allergies    ROS:    As above otherwise ROS is unremarkable.    OBJECTIVE:  BP (!) 160/108   Pulse 60   Temp 98.5  F (36.9  C)   Resp 20   Ht 1.854 m (6' 0.99\")   Wt 138.1 kg (304 lb 6.4 oz)   SpO2 98%   BMI 40.17 kg/m      EXAM:  General Appearance: He appears young comfortable, nontoxic appearing.  Head: Normal. Normocephalic, atraumatic.  Lungs: Normal chest wall and respirations. Clear to auscultation, no wheezes or crackles.  Cardiovascular: Regular rate and rhythm. S1, S2, no murmurs.  No carotid bruits.  No rubs.  Musculoskeletal: No edema.  Skin: no concerning or new rashes.  Neurologic Exam: CN 2-12 grossly intact.  Normal gait.  Symmetric DTRs, No focal motor or sensory deficits. No tremor.  Psychiatric Exam: Alert and oriented, appropriate affect.    Results for orders placed or performed during the hospital encounter of 07/19/21   XR Chest 2 Views     Status: None    Narrative    PROCEDURE:  XR CHEST 2 VW    HISTORY:  Essential hypertension.     COMPARISON:  None.    FINDINGS:   A poor inspiratory result was achieved. The heart is normal in size.  The pulmonary vasculature is normal.  The lungs are clear. No pleural  effusion or pneumothorax.      Impression    " IMPRESSION: No evidence of active cardiopulmonary disease    MARCI STANTON MD         SYSTEM ID:  [id number]   Results for orders placed or performed in visit on 07/19/21   Comprehensive Metabolic Panel     Status: Abnormal   Result Value Ref Range    Sodium 135 134 - 144 mmol/L    Potassium 4.1 3.5 - 5.1 mmol/L    Chloride 101 98 - 107 mmol/L    Carbon Dioxide (CO2) 27 21 - 31 mmol/L    Anion Gap 7 3 - 14 mmol/L    Urea Nitrogen 12 7 - 25 mg/dL    Creatinine 1.09 0.70 - 1.30 mg/dL    Calcium 9.5 8.6 - 10.3 mg/dL    Glucose 130 (H) 70 - 105 mg/dL    Alkaline Phosphatase 35 34 - 104 U/L    AST 27 13 - 39 U/L    ALT 42 7 - 52 U/L    Protein Total 7.4 6.4 - 8.9 g/dL    Albumin 4.7 3.5 - 5.7 g/dL    Bilirubin Total 0.7 0.3 - 1.0 mg/dL    GFR Estimate 84 >60 mL/min/1.73m2   Troponin I     Status: Normal   Result Value Ref Range    Troponin I 5.7 0.0 - 34.0 pg/mL   CRP inflammation     Status: Normal   Result Value Ref Range    CRP Inflammation 9.0 <10.0 mg/L   Sedimentation Rate (ESR)     Status: Normal   Result Value Ref Range    Erythrocyte Sedimentation Rate 5 0 - 15 mm/hr   D dimer quantitative     Status: Normal   Result Value Ref Range    D-Dimer Quantitative <=0.27 0.00 - 0.50 ug/mL FEU    Narrative    This D-dimer assay is intended for use in conjunction with a clinical pretest probability assessment model to exclude pulmonary embolism (PE) and deep venous thrombosis (DVT) in outpatients suspected of PE or DVT. The cut-off value is 0.50 ug/mL FEU.   CBC with platelets and differential     Status: Abnormal   Result Value Ref Range    WBC Count 13.5 (H) 4.0 - 11.0 10e3/uL    RBC Count 5.35 4.40 - 5.90 10e6/uL    Hemoglobin 15.7 13.3 - 17.7 g/dL    Hematocrit 44.7 40.0 - 53.0 %    MCV 84 78 - 100 fL    MCH 29.3 26.5 - 33.0 pg    MCHC 35.1 31.5 - 36.5 g/dL    RDW 12.5 10.0 - 15.0 %    Platelet Count 215 150 - 450 10e3/uL    % Neutrophils 88 %    % Lymphocytes 8 %    % Monocytes 3 %    % Eosinophils 0 %    %  Basophils 0 %    % Immature Granulocytes 1 %    NRBCs per 100 WBC 0 <1 /100    Absolute Neutrophils 11.8 (H) 1.6 - 8.3 10e3/uL    Absolute Lymphocytes 1.1 0.8 - 5.3 10e3/uL    Absolute Monocytes 0.4 0.0 - 1.3 10e3/uL    Absolute Eosinophils 0.0 0.0 - 0.7 10e3/uL    Absolute Basophils 0.0 0.0 - 0.2 10e3/uL    Absolute Immature Granulocytes 0.1 (H) <=0.0 10e3/uL    Absolute NRBCs 0.0 10e3/uL   EKG 12-lead, tracing only     Status: None (Preliminary result)   Result Value Ref Range    Systolic Blood Pressure  mmHg    Diastolic Blood Pressure  mmHg    Ventricular Rate 59 BPM    Atrial Rate 59 BPM    OH Interval 226 ms    QRS Duration 122 ms     ms    QTc 447 ms    P Axis 32 degrees    R AXIS 21 degrees    T Axis 25 degrees    Interpretation ECG       Sinus bradycardia with 1st degree A-V block  Non-specific intra-ventricular conduction delay  Borderline ECG  No previous ECGs available     CBC and Differential     Status: Abnormal    Narrative    The following orders were created for panel order CBC and Differential.  Procedure                               Abnormality         Status                     ---------                               -----------         ------                     CBC with platelets and d...[[id number]]  Abnormal            Final result                 Please view results for these tests on the individual orders.   Extra Tube     Status: None (In process)    Narrative    The following orders were created for panel order Extra Tube.  Procedure                               Abnormality         Status                     ---------                               -----------         ------                     Extra Serum Separator Tu...[[id number]]                      In process                   Please view results for these tests on the individual orders.        ASSESSEMENT AND PLAN:    1. Chest pain on breathing    2. Essential hypertension    3. Morbid obesity (H)      Chest x-ray was  performed, personally reviewed and shows no acute abnormalities.  EKG was also performed, personally reviewed and shows normal sinus rhythm, he has a first-degree AV block but no ischemic changes.  D-dimer is negative making pulmonary embolism unlikely.  Troponin, inflammatory markers came back negative which is also reassuring.  White count is slightly elevated however he has no signs of infection whatsoever so we will hold off on any antibiotics.  Discussed that I suspect this may related to panic attack since his second set of symptoms in the last week.  We will trial a small dose of clonazepam to be used as needed.  Certainly if he has new symptoms or if they worsen he will return for reassessment.      Priyank Barbour MD  Family Medicine

## 2021-07-19 NOTE — NURSING NOTE
Patient presents today for chest tightness and pain that started last night.    Medication Reconciliation Complete    Priscilla Andrade LPN  7/19/2021 10:24 AM

## 2021-07-23 NOTE — ADDENDUM NOTE
Addended by: ESEQUIEL BLACKWELL on: 7/21/2020 05:06 PM     Modules accepted: Orders     Therapeutic paracentesis

## 2021-07-26 LAB
ATRIAL RATE - MUSE: 59 BPM
DIASTOLIC BLOOD PRESSURE - MUSE: NORMAL MMHG
INTERPRETATION ECG - MUSE: NORMAL
P AXIS - MUSE: 32 DEGREES
PR INTERVAL - MUSE: 226 MS
QRS DURATION - MUSE: 122 MS
QT - MUSE: 452 MS
QTC - MUSE: 447 MS
R AXIS - MUSE: 21 DEGREES
SYSTOLIC BLOOD PRESSURE - MUSE: NORMAL MMHG
T AXIS - MUSE: 25 DEGREES
VENTRICULAR RATE- MUSE: 59 BPM

## 2025-04-13 ENCOUNTER — ANESTHESIA (OUTPATIENT)
Dept: SURGERY | Facility: OTHER | Age: 45
End: 2025-04-13
Payer: COMMERCIAL

## 2025-04-13 ENCOUNTER — ANESTHESIA EVENT (OUTPATIENT)
Dept: SURGERY | Facility: OTHER | Age: 45
End: 2025-04-13
Payer: COMMERCIAL

## 2025-04-13 ENCOUNTER — APPOINTMENT (OUTPATIENT)
Dept: CT IMAGING | Facility: OTHER | Age: 45
End: 2025-04-13
Attending: PHYSICIAN ASSISTANT
Payer: COMMERCIAL

## 2025-04-13 ENCOUNTER — HOSPITAL ENCOUNTER (INPATIENT)
Facility: OTHER | Age: 45
LOS: 1 days | Discharge: HOME | End: 2025-04-14
Attending: PHYSICIAN ASSISTANT | Admitting: SURGERY
Payer: COMMERCIAL

## 2025-04-13 ENCOUNTER — HOSPITAL ENCOUNTER (INPATIENT)
Facility: OTHER | Age: 45
Setting detail: SURGERY ADMIT
End: 2025-04-13
Attending: SURGERY | Admitting: SURGERY
Payer: COMMERCIAL

## 2025-04-13 DIAGNOSIS — Z98.890 POSTOPERATIVE STATE: ICD-10-CM

## 2025-04-13 DIAGNOSIS — A41.9 SEPSIS (H): Primary | ICD-10-CM

## 2025-04-13 DIAGNOSIS — K76.0 FATTY LIVER: ICD-10-CM

## 2025-04-13 DIAGNOSIS — K35.32 RUPTURED APPENDICITIS: ICD-10-CM

## 2025-04-13 DIAGNOSIS — E27.8 LEFT ADRENAL MASS: ICD-10-CM

## 2025-04-13 LAB
ALBUMIN SERPL BCG-MCNC: 4.3 G/DL (ref 3.5–5.2)
ALBUMIN UR-MCNC: NEGATIVE MG/DL
ALP SERPL-CCNC: 38 U/L (ref 40–150)
ALT SERPL W P-5'-P-CCNC: 51 U/L (ref 0–70)
ANION GAP SERPL CALCULATED.3IONS-SCNC: 10 MMOL/L (ref 7–15)
APPEARANCE UR: CLEAR
AST SERPL W P-5'-P-CCNC: 28 U/L (ref 0–45)
BASOPHILS # BLD AUTO: 0 10E3/UL (ref 0–0.2)
BASOPHILS NFR BLD AUTO: 0 %
BILIRUB SERPL-MCNC: 1.4 MG/DL
BILIRUB UR QL STRIP: NEGATIVE
BUN SERPL-MCNC: 14.4 MG/DL (ref 6–20)
CALCIUM SERPL-MCNC: 8.9 MG/DL (ref 8.8–10.4)
CHLORIDE SERPL-SCNC: 101 MMOL/L (ref 98–107)
COLOR UR AUTO: YELLOW
CREAT SERPL-MCNC: 1.12 MG/DL (ref 0.67–1.17)
CRP SERPL-MCNC: 95.27 MG/L
EGFRCR SERPLBLD CKD-EPI 2021: 83 ML/MIN/1.73M2
EOSINOPHIL # BLD AUTO: 0 10E3/UL (ref 0–0.7)
EOSINOPHIL NFR BLD AUTO: 0 %
ERYTHROCYTE [DISTWIDTH] IN BLOOD BY AUTOMATED COUNT: 12.8 % (ref 10–15)
EST. AVERAGE GLUCOSE BLD GHB EST-MCNC: 105 MG/DL
GLUCOSE SERPL-MCNC: 134 MG/DL (ref 70–99)
GLUCOSE UR STRIP-MCNC: NEGATIVE MG/DL
HBA1C MFR BLD: 5.3 %
HCO3 SERPL-SCNC: 24 MMOL/L (ref 22–29)
HCT VFR BLD AUTO: 41.7 % (ref 40–53)
HGB BLD-MCNC: 14.8 G/DL (ref 13.3–17.7)
HGB UR QL STRIP: NEGATIVE
HOLD SPECIMEN: NORMAL
HOLD SPECIMEN: NORMAL
IMM GRANULOCYTES # BLD: 0.1 10E3/UL
IMM GRANULOCYTES NFR BLD: 0 %
KETONES UR STRIP-MCNC: NEGATIVE MG/DL
LACTATE SERPL-SCNC: 1.2 MMOL/L (ref 0.7–2)
LEUKOCYTE ESTERASE UR QL STRIP: NEGATIVE
LIPASE SERPL-CCNC: 16 U/L (ref 13–60)
LYMPHOCYTES # BLD AUTO: 1 10E3/UL (ref 0.8–5.3)
LYMPHOCYTES NFR BLD AUTO: 6 %
MAGNESIUM SERPL-MCNC: 1.8 MG/DL (ref 1.7–2.3)
MCH RBC QN AUTO: 29.6 PG (ref 26.5–33)
MCHC RBC AUTO-ENTMCNC: 35.5 G/DL (ref 31.5–36.5)
MCV RBC AUTO: 83 FL (ref 78–100)
MONOCYTES # BLD AUTO: 0.9 10E3/UL (ref 0–1.3)
MONOCYTES NFR BLD AUTO: 5 %
NEUTROPHILS # BLD AUTO: 14.9 10E3/UL (ref 1.6–8.3)
NEUTROPHILS NFR BLD AUTO: 88 %
NITRATE UR QL: NEGATIVE
NRBC # BLD AUTO: 0 10E3/UL
NRBC BLD AUTO-RTO: 0 /100
PH UR STRIP: 6.5 [PH] (ref 5–9)
PLATELET # BLD AUTO: 163 10E3/UL (ref 150–450)
POTASSIUM SERPL-SCNC: 4.2 MMOL/L (ref 3.4–5.3)
PROT SERPL-MCNC: 7.1 G/DL (ref 6.4–8.3)
RBC # BLD AUTO: 5 10E6/UL (ref 4.4–5.9)
RBC URINE: <1 /HPF
SODIUM SERPL-SCNC: 135 MMOL/L (ref 135–145)
SP GR UR STRIP: 1.04 (ref 1–1.03)
UROBILINOGEN UR STRIP-MCNC: NORMAL MG/DL
WBC # BLD AUTO: 16.8 10E3/UL (ref 4–11)
WBC URINE: <1 /HPF

## 2025-04-13 PROCEDURE — 250N000011 HC RX IP 250 OP 636: Performed by: PHYSICIAN ASSISTANT

## 2025-04-13 PROCEDURE — 36415 COLL VENOUS BLD VENIPUNCTURE: CPT | Performed by: PHYSICIAN ASSISTANT

## 2025-04-13 PROCEDURE — 250N000009 HC RX 250

## 2025-04-13 PROCEDURE — 99285 EMERGENCY DEPT VISIT HI MDM: CPT | Mod: 25 | Performed by: PHYSICIAN ASSISTANT

## 2025-04-13 PROCEDURE — 85025 COMPLETE CBC W/AUTO DIFF WBC: CPT | Performed by: PHYSICIAN ASSISTANT

## 2025-04-13 PROCEDURE — 83690 ASSAY OF LIPASE: CPT | Performed by: PHYSICIAN ASSISTANT

## 2025-04-13 PROCEDURE — 258N000003 HC RX IP 258 OP 636

## 2025-04-13 PROCEDURE — 74177 CT ABD & PELVIS W/CONTRAST: CPT | Mod: 26 | Performed by: RADIOLOGY

## 2025-04-13 PROCEDURE — 370N000017 HC ANESTHESIA TECHNICAL FEE, PER MIN: Performed by: SURGERY

## 2025-04-13 PROCEDURE — 83605 ASSAY OF LACTIC ACID: CPT | Performed by: PHYSICIAN ASSISTANT

## 2025-04-13 PROCEDURE — 258N000003 HC RX IP 258 OP 636: Performed by: PHYSICIAN ASSISTANT

## 2025-04-13 PROCEDURE — 44970 LAPAROSCOPY APPENDECTOMY: CPT

## 2025-04-13 PROCEDURE — 0DTJ4ZZ RESECTION OF APPENDIX, PERCUTANEOUS ENDOSCOPIC APPROACH: ICD-10-PCS | Performed by: SURGERY

## 2025-04-13 PROCEDURE — 250N000013 HC RX MED GY IP 250 OP 250 PS 637: Performed by: SURGERY

## 2025-04-13 PROCEDURE — 250N000025 HC SEVOFLURANE, PER MIN: Performed by: SURGERY

## 2025-04-13 PROCEDURE — 86140 C-REACTIVE PROTEIN: CPT | Performed by: PHYSICIAN ASSISTANT

## 2025-04-13 PROCEDURE — 250N000009 HC RX 250: Performed by: PHYSICIAN ASSISTANT

## 2025-04-13 PROCEDURE — 250N000011 HC RX IP 250 OP 636

## 2025-04-13 PROCEDURE — 74177 CT ABD & PELVIS W/CONTRAST: CPT

## 2025-04-13 PROCEDURE — 96375 TX/PRO/DX INJ NEW DRUG ADDON: CPT | Performed by: PHYSICIAN ASSISTANT

## 2025-04-13 PROCEDURE — 84295 ASSAY OF SERUM SODIUM: CPT | Performed by: PHYSICIAN ASSISTANT

## 2025-04-13 PROCEDURE — 250N000013 HC RX MED GY IP 250 OP 250 PS 637: Performed by: PHYSICIAN ASSISTANT

## 2025-04-13 PROCEDURE — 99140 ANES COMP EMERGENCY COND: CPT

## 2025-04-13 PROCEDURE — 250N000011 HC RX IP 250 OP 636: Mod: JZ | Performed by: SURGERY

## 2025-04-13 PROCEDURE — 258N000001 HC RX 258: Performed by: SURGERY

## 2025-04-13 PROCEDURE — 83735 ASSAY OF MAGNESIUM: CPT | Performed by: PHYSICIAN ASSISTANT

## 2025-04-13 PROCEDURE — 710N000010 HC RECOVERY PHASE 1, LEVEL 2, PER MIN: Performed by: SURGERY

## 2025-04-13 PROCEDURE — 999N000157 HC STATISTIC RCP TIME EA 10 MIN

## 2025-04-13 PROCEDURE — 88304 TISSUE EXAM BY PATHOLOGIST: CPT

## 2025-04-13 PROCEDURE — 258N000003 HC RX IP 258 OP 636: Performed by: SURGERY

## 2025-04-13 PROCEDURE — 96365 THER/PROPH/DIAG IV INF INIT: CPT | Performed by: PHYSICIAN ASSISTANT

## 2025-04-13 PROCEDURE — 83036 HEMOGLOBIN GLYCOSYLATED A1C: CPT | Performed by: PHYSICIAN ASSISTANT

## 2025-04-13 PROCEDURE — 120N000001 HC R&B MED SURG/OB

## 2025-04-13 PROCEDURE — 44970 LAPAROSCOPY APPENDECTOMY: CPT | Performed by: SURGERY

## 2025-04-13 PROCEDURE — 360N000076 HC SURGERY LEVEL 3, PER MIN: Performed by: SURGERY

## 2025-04-13 PROCEDURE — 272N000001 HC OR GENERAL SUPPLY STERILE: Performed by: SURGERY

## 2025-04-13 PROCEDURE — 96361 HYDRATE IV INFUSION ADD-ON: CPT | Performed by: PHYSICIAN ASSISTANT

## 2025-04-13 PROCEDURE — 272N000002 HC OR SUPPLY OTHER OPNP: Performed by: SURGERY

## 2025-04-13 PROCEDURE — 99285 EMERGENCY DEPT VISIT HI MDM: CPT | Performed by: PHYSICIAN ASSISTANT

## 2025-04-13 PROCEDURE — 87040 BLOOD CULTURE FOR BACTERIA: CPT | Performed by: PHYSICIAN ASSISTANT

## 2025-04-13 PROCEDURE — 81001 URINALYSIS AUTO W/SCOPE: CPT | Performed by: PHYSICIAN ASSISTANT

## 2025-04-13 RX ORDER — NALOXONE HYDROCHLORIDE 0.4 MG/ML
0.4 INJECTION, SOLUTION INTRAMUSCULAR; INTRAVENOUS; SUBCUTANEOUS
Status: DISCONTINUED | OUTPATIENT
Start: 2025-04-13 | End: 2025-04-14 | Stop reason: HOSPADM

## 2025-04-13 RX ORDER — HYDROMORPHONE HYDROCHLORIDE 1 MG/ML
0.5 INJECTION, SOLUTION INTRAMUSCULAR; INTRAVENOUS; SUBCUTANEOUS EVERY 30 MIN PRN
Status: DISCONTINUED | OUTPATIENT
Start: 2025-04-13 | End: 2025-04-13

## 2025-04-13 RX ORDER — FENTANYL CITRATE 50 UG/ML
INJECTION, SOLUTION INTRAMUSCULAR; INTRAVENOUS PRN
Status: DISCONTINUED | OUTPATIENT
Start: 2025-04-13 | End: 2025-04-13

## 2025-04-13 RX ORDER — DEXAMETHASONE SODIUM PHOSPHATE 4 MG/ML
INJECTION, SOLUTION INTRA-ARTICULAR; INTRALESIONAL; INTRAMUSCULAR; INTRAVENOUS; SOFT TISSUE PRN
Status: DISCONTINUED | OUTPATIENT
Start: 2025-04-13 | End: 2025-04-13

## 2025-04-13 RX ORDER — BUPIVACAINE HYDROCHLORIDE 5 MG/ML
INJECTION, SOLUTION PERINEURAL PRN
Status: DISCONTINUED | OUTPATIENT
Start: 2025-04-13 | End: 2025-04-13 | Stop reason: HOSPADM

## 2025-04-13 RX ORDER — ACETAMINOPHEN 325 MG/1
975 TABLET ORAL EVERY 8 HOURS
Status: DISCONTINUED | OUTPATIENT
Start: 2025-04-13 | End: 2025-04-14 | Stop reason: HOSPADM

## 2025-04-13 RX ORDER — PIPERACILLIN SODIUM, TAZOBACTAM SODIUM 4; .5 G/20ML; G/20ML
4.5 INJECTION, POWDER, LYOPHILIZED, FOR SOLUTION INTRAVENOUS ONCE
Status: COMPLETED | OUTPATIENT
Start: 2025-04-13 | End: 2025-04-13

## 2025-04-13 RX ORDER — SODIUM CHLORIDE, SODIUM LACTATE, POTASSIUM CHLORIDE, CALCIUM CHLORIDE 600; 310; 30; 20 MG/100ML; MG/100ML; MG/100ML; MG/100ML
INJECTION, SOLUTION INTRAVENOUS CONTINUOUS PRN
Status: DISCONTINUED | OUTPATIENT
Start: 2025-04-13 | End: 2025-04-13

## 2025-04-13 RX ORDER — KETOROLAC TROMETHAMINE 30 MG/ML
INJECTION, SOLUTION INTRAMUSCULAR; INTRAVENOUS PRN
Status: DISCONTINUED | OUTPATIENT
Start: 2025-04-13 | End: 2025-04-13

## 2025-04-13 RX ORDER — ONDANSETRON 2 MG/ML
4 INJECTION INTRAMUSCULAR; INTRAVENOUS EVERY 30 MIN PRN
Status: DISCONTINUED | OUTPATIENT
Start: 2025-04-13 | End: 2025-04-13

## 2025-04-13 RX ORDER — METRONIDAZOLE 500 MG/100ML
500 INJECTION, SOLUTION INTRAVENOUS EVERY 12 HOURS
Status: DISCONTINUED | OUTPATIENT
Start: 2025-04-13 | End: 2025-04-14 | Stop reason: HOSPADM

## 2025-04-13 RX ORDER — IOPAMIDOL 755 MG/ML
150 INJECTION, SOLUTION INTRAVASCULAR ONCE
Status: COMPLETED | OUTPATIENT
Start: 2025-04-13 | End: 2025-04-13

## 2025-04-13 RX ORDER — KETAMINE HYDROCHLORIDE 10 MG/ML
INJECTION INTRAMUSCULAR; INTRAVENOUS PRN
Status: DISCONTINUED | OUTPATIENT
Start: 2025-04-13 | End: 2025-04-13

## 2025-04-13 RX ORDER — PIPERACILLIN SODIUM, TAZOBACTAM SODIUM 4; .5 G/20ML; G/20ML
4.5 INJECTION, POWDER, LYOPHILIZED, FOR SOLUTION INTRAVENOUS EVERY 6 HOURS
Status: DISCONTINUED | OUTPATIENT
Start: 2025-04-13 | End: 2025-04-13

## 2025-04-13 RX ORDER — ONDANSETRON 2 MG/ML
4 INJECTION INTRAMUSCULAR; INTRAVENOUS EVERY 6 HOURS PRN
Status: DISCONTINUED | OUTPATIENT
Start: 2025-04-13 | End: 2025-04-14 | Stop reason: HOSPADM

## 2025-04-13 RX ORDER — HYDROMORPHONE HYDROCHLORIDE 1 MG/ML
0.5 INJECTION, SOLUTION INTRAMUSCULAR; INTRAVENOUS; SUBCUTANEOUS
Status: DISCONTINUED | OUTPATIENT
Start: 2025-04-13 | End: 2025-04-14 | Stop reason: HOSPADM

## 2025-04-13 RX ORDER — ACETAMINOPHEN 650 MG/1
650 SUPPOSITORY RECTAL
Status: DISCONTINUED | OUTPATIENT
Start: 2025-04-13 | End: 2025-04-13

## 2025-04-13 RX ORDER — NALOXONE HYDROCHLORIDE 0.4 MG/ML
0.2 INJECTION, SOLUTION INTRAMUSCULAR; INTRAVENOUS; SUBCUTANEOUS
Status: DISCONTINUED | OUTPATIENT
Start: 2025-04-13 | End: 2025-04-14 | Stop reason: HOSPADM

## 2025-04-13 RX ORDER — PIPERACILLIN SODIUM, TAZOBACTAM SODIUM 3; .375 G/15ML; G/15ML
3.38 INJECTION, POWDER, LYOPHILIZED, FOR SOLUTION INTRAVENOUS EVERY 6 HOURS
Status: DISCONTINUED | OUTPATIENT
Start: 2025-04-13 | End: 2025-04-13

## 2025-04-13 RX ORDER — OXYCODONE HYDROCHLORIDE 5 MG/1
10 TABLET ORAL EVERY 4 HOURS PRN
Status: DISCONTINUED | OUTPATIENT
Start: 2025-04-13 | End: 2025-04-14 | Stop reason: HOSPADM

## 2025-04-13 RX ORDER — HYDROMORPHONE HCL IN WATER/PF 6 MG/30 ML
0.2 PATIENT CONTROLLED ANALGESIA SYRINGE INTRAVENOUS
Status: DISCONTINUED | OUTPATIENT
Start: 2025-04-13 | End: 2025-04-14 | Stop reason: HOSPADM

## 2025-04-13 RX ORDER — ONDANSETRON 2 MG/ML
INJECTION INTRAMUSCULAR; INTRAVENOUS PRN
Status: DISCONTINUED | OUTPATIENT
Start: 2025-04-13 | End: 2025-04-13

## 2025-04-13 RX ORDER — KETOROLAC TROMETHAMINE 30 MG/ML
30 INJECTION, SOLUTION INTRAMUSCULAR; INTRAVENOUS ONCE
Status: COMPLETED | OUTPATIENT
Start: 2025-04-13 | End: 2025-04-13

## 2025-04-13 RX ORDER — ACETAMINOPHEN 325 MG/1
975 TABLET ORAL ONCE
Status: DISCONTINUED | OUTPATIENT
Start: 2025-04-13 | End: 2025-04-13 | Stop reason: HOSPADM

## 2025-04-13 RX ORDER — ONDANSETRON 4 MG/1
4 TABLET, ORALLY DISINTEGRATING ORAL EVERY 6 HOURS PRN
Status: DISCONTINUED | OUTPATIENT
Start: 2025-04-13 | End: 2025-04-14 | Stop reason: HOSPADM

## 2025-04-13 RX ORDER — LIDOCAINE HYDROCHLORIDE 20 MG/ML
INJECTION, SOLUTION INFILTRATION; PERINEURAL PRN
Status: DISCONTINUED | OUTPATIENT
Start: 2025-04-13 | End: 2025-04-13

## 2025-04-13 RX ORDER — ACETAMINOPHEN 325 MG/1
650 TABLET ORAL
Status: DISCONTINUED | OUTPATIENT
Start: 2025-04-13 | End: 2025-04-13

## 2025-04-13 RX ORDER — ACETAMINOPHEN 10 MG/ML
INJECTION, SOLUTION INTRAVENOUS PRN
Status: DISCONTINUED | OUTPATIENT
Start: 2025-04-13 | End: 2025-04-13

## 2025-04-13 RX ORDER — PROPOFOL 10 MG/ML
INJECTION, EMULSION INTRAVENOUS PRN
Status: DISCONTINUED | OUTPATIENT
Start: 2025-04-13 | End: 2025-04-13

## 2025-04-13 RX ORDER — AMOXICILLIN 250 MG
1 CAPSULE ORAL 2 TIMES DAILY
Status: DISCONTINUED | OUTPATIENT
Start: 2025-04-13 | End: 2025-04-14 | Stop reason: HOSPADM

## 2025-04-13 RX ORDER — OXYCODONE HYDROCHLORIDE 5 MG/1
5 TABLET ORAL EVERY 4 HOURS PRN
Status: DISCONTINUED | OUTPATIENT
Start: 2025-04-13 | End: 2025-04-14 | Stop reason: HOSPADM

## 2025-04-13 RX ORDER — PROCHLORPERAZINE MALEATE 10 MG
10 TABLET ORAL EVERY 6 HOURS PRN
Status: DISCONTINUED | OUTPATIENT
Start: 2025-04-13 | End: 2025-04-14 | Stop reason: HOSPADM

## 2025-04-13 RX ORDER — CIPROFLOXACIN 2 MG/ML
400 INJECTION, SOLUTION INTRAVENOUS EVERY 12 HOURS
Status: DISCONTINUED | OUTPATIENT
Start: 2025-04-13 | End: 2025-04-14 | Stop reason: HOSPADM

## 2025-04-13 RX ORDER — LIDOCAINE 40 MG/G
CREAM TOPICAL
Status: DISCONTINUED | OUTPATIENT
Start: 2025-04-13 | End: 2025-04-14 | Stop reason: HOSPADM

## 2025-04-13 RX ORDER — SODIUM CHLORIDE, SODIUM LACTATE, POTASSIUM CHLORIDE, CALCIUM CHLORIDE 600; 310; 30; 20 MG/100ML; MG/100ML; MG/100ML; MG/100ML
INJECTION, SOLUTION INTRAVENOUS CONTINUOUS
Status: DISCONTINUED | OUTPATIENT
Start: 2025-04-13 | End: 2025-04-14 | Stop reason: HOSPADM

## 2025-04-13 RX ADMIN — ONDANSETRON HYDROCHLORIDE 4 MG: 2 SOLUTION INTRAMUSCULAR; INTRAVENOUS at 15:28

## 2025-04-13 RX ADMIN — PROPOFOL 250 MG: 10 INJECTION, EMULSION INTRAVENOUS at 15:36

## 2025-04-13 RX ADMIN — ROCURONIUM BROMIDE 20 MG: 50 INJECTION, SOLUTION INTRAVENOUS at 15:50

## 2025-04-13 RX ADMIN — KETOROLAC TROMETHAMINE 30 MG: 30 INJECTION, SOLUTION INTRAMUSCULAR at 10:51

## 2025-04-13 RX ADMIN — METRONIDAZOLE 500 MG: 500 INJECTION, SOLUTION INTRAVENOUS at 20:09

## 2025-04-13 RX ADMIN — PIPERACILLIN AND TAZOBACTAM 4.5 G: 4; .5 INJECTION, POWDER, LYOPHILIZED, FOR SOLUTION INTRAVENOUS at 10:52

## 2025-04-13 RX ADMIN — MIDAZOLAM HYDROCHLORIDE 2 MG: 1 INJECTION, SOLUTION INTRAMUSCULAR; INTRAVENOUS at 15:28

## 2025-04-13 RX ADMIN — SODIUM CHLORIDE 60 ML: 9 INJECTION, SOLUTION INTRAVENOUS at 11:59

## 2025-04-13 RX ADMIN — CIPROFLOXACIN 400 MG: 400 INJECTION, SOLUTION INTRAVENOUS at 18:40

## 2025-04-13 RX ADMIN — FENTANYL CITRATE 25 MCG: 50 INJECTION INTRAMUSCULAR; INTRAVENOUS at 16:28

## 2025-04-13 RX ADMIN — PROPOFOL 20 MG: 10 INJECTION, EMULSION INTRAVENOUS at 16:15

## 2025-04-13 RX ADMIN — PROPOFOL 20 MG: 10 INJECTION, EMULSION INTRAVENOUS at 16:09

## 2025-04-13 RX ADMIN — FENTANYL CITRATE 25 MCG: 50 INJECTION INTRAMUSCULAR; INTRAVENOUS at 16:29

## 2025-04-13 RX ADMIN — FENTANYL CITRATE 25 MCG: 50 INJECTION INTRAMUSCULAR; INTRAVENOUS at 16:08

## 2025-04-13 RX ADMIN — LIDOCAINE HYDROCHLORIDE 100 MG: 20 INJECTION, SOLUTION INFILTRATION; PERINEURAL at 15:36

## 2025-04-13 RX ADMIN — SODIUM CHLORIDE, POTASSIUM CHLORIDE, SODIUM LACTATE AND CALCIUM CHLORIDE: 600; 310; 30; 20 INJECTION, SOLUTION INTRAVENOUS at 20:10

## 2025-04-13 RX ADMIN — Medication 15 MG: at 16:05

## 2025-04-13 RX ADMIN — SODIUM CHLORIDE 1397 ML: 9 INJECTION, SOLUTION INTRAVENOUS at 10:49

## 2025-04-13 RX ADMIN — ACETAMINOPHEN 975 MG: 325 TABLET, FILM COATED ORAL at 19:54

## 2025-04-13 RX ADMIN — ACETAMINOPHEN 650 MG: 325 TABLET, FILM COATED ORAL at 11:01

## 2025-04-13 RX ADMIN — FENTANYL CITRATE 50 MCG: 50 INJECTION INTRAMUSCULAR; INTRAVENOUS at 15:36

## 2025-04-13 RX ADMIN — SUGAMMADEX 100 MG: 100 INJECTION, SOLUTION INTRAVENOUS at 16:24

## 2025-04-13 RX ADMIN — ACETAMINOPHEN 1000 MG: 10 INJECTION, SOLUTION INTRAVENOUS at 16:19

## 2025-04-13 RX ADMIN — ROCURONIUM BROMIDE 20 MG: 50 INJECTION, SOLUTION INTRAVENOUS at 15:58

## 2025-04-13 RX ADMIN — SENNOSIDES AND DOCUSATE SODIUM 1 TABLET: 50; 8.6 TABLET ORAL at 21:30

## 2025-04-13 RX ADMIN — FENTANYL CITRATE 25 MCG: 50 INJECTION INTRAMUSCULAR; INTRAVENOUS at 16:13

## 2025-04-13 RX ADMIN — PROPOFOL 10 MG: 10 INJECTION, EMULSION INTRAVENOUS at 16:29

## 2025-04-13 RX ADMIN — SODIUM CHLORIDE 1000 ML: 9 INJECTION, SOLUTION INTRAVENOUS at 10:49

## 2025-04-13 RX ADMIN — ROCURONIUM BROMIDE 50 MG: 50 INJECTION, SOLUTION INTRAVENOUS at 15:37

## 2025-04-13 RX ADMIN — SUGAMMADEX 100 MG: 100 INJECTION, SOLUTION INTRAVENOUS at 16:23

## 2025-04-13 RX ADMIN — PROPOFOL 50 MG: 10 INJECTION, EMULSION INTRAVENOUS at 15:43

## 2025-04-13 RX ADMIN — KETOROLAC TROMETHAMINE 30 MG: 30 INJECTION, SOLUTION INTRAMUSCULAR at 16:19

## 2025-04-13 RX ADMIN — DEXAMETHASONE SODIUM PHOSPHATE 8 MG: 4 INJECTION, SOLUTION INTRA-ARTICULAR; INTRALESIONAL; INTRAMUSCULAR; INTRAVENOUS; SOFT TISSUE at 15:28

## 2025-04-13 RX ADMIN — HYDROMORPHONE HYDROCHLORIDE 0.5 MG: 1 INJECTION, SOLUTION INTRAMUSCULAR; INTRAVENOUS; SUBCUTANEOUS at 15:16

## 2025-04-13 RX ADMIN — FENTANYL CITRATE 50 MCG: 50 INJECTION INTRAMUSCULAR; INTRAVENOUS at 15:54

## 2025-04-13 RX ADMIN — PROPOFOL 30 MG: 10 INJECTION, EMULSION INTRAVENOUS at 16:04

## 2025-04-13 RX ADMIN — IOPAMIDOL 150 ML: 755 INJECTION, SOLUTION INTRAVENOUS at 11:59

## 2025-04-13 RX ADMIN — SODIUM CHLORIDE, SODIUM LACTATE, POTASSIUM CHLORIDE, AND CALCIUM CHLORIDE: .6; .31; .03; .02 INJECTION, SOLUTION INTRAVENOUS at 15:26

## 2025-04-13 ASSESSMENT — ACTIVITIES OF DAILY LIVING (ADL)
ADLS_ACUITY_SCORE: 15
ADLS_ACUITY_SCORE: 41
ADLS_ACUITY_SCORE: 15
ADLS_ACUITY_SCORE: 41
DOING_ERRANDS_INDEPENDENTLY_DIFFICULTY: NO
ADLS_ACUITY_SCORE: 15
ADLS_ACUITY_SCORE: 15
FALL_HISTORY_WITHIN_LAST_SIX_MONTHS: NO
CONCENTRATING,_REMEMBERING_OR_MAKING_DECISIONS_DIFFICULTY: NO
DRESSING/BATHING_DIFFICULTY: NO
HEARING_DIFFICULTY_OR_DEAF: NO
ADLS_ACUITY_SCORE: 15
ADLS_ACUITY_SCORE: 41
WEAR_GLASSES_OR_BLIND: NO
ADLS_ACUITY_SCORE: 41
DIFFICULTY_COMMUNICATING: NO
ADLS_ACUITY_SCORE: 41
ADLS_ACUITY_SCORE: 41
CHANGE_IN_FUNCTIONAL_STATUS_SINCE_ONSET_OF_CURRENT_ILLNESS/INJURY: NO
ADLS_ACUITY_SCORE: 15
TOILETING_ISSUES: NO
ADLS_ACUITY_SCORE: 41
WALKING_OR_CLIMBING_STAIRS_DIFFICULTY: NO
DIFFICULTY_EATING/SWALLOWING: NO

## 2025-04-13 ASSESSMENT — COLUMBIA-SUICIDE SEVERITY RATING SCALE - C-SSRS
2. HAVE YOU ACTUALLY HAD ANY THOUGHTS OF KILLING YOURSELF IN THE PAST MONTH?: NO
6. HAVE YOU EVER DONE ANYTHING, STARTED TO DO ANYTHING, OR PREPARED TO DO ANYTHING TO END YOUR LIFE?: NO
1. IN THE PAST MONTH, HAVE YOU WISHED YOU WERE DEAD OR WISHED YOU COULD GO TO SLEEP AND NOT WAKE UP?: NO

## 2025-04-13 NOTE — OR NURSING
PACU Transfer Note    August Mckeon was transferred to [facility] via cot at 1717  Equipment used for transport:  oxygen     .  Accompanied by:  Michelle PANTOJA  Prescriptions were: none    PACU Respiratory Event Documentation     1) Episodes of Apnea greater than or equal to 10 seconds: 0    2) Bradypnea - less than 8 breaths per minute: 0    3) Pain score on 0 to 10 scale: 0/10    4) Pain-sedation mismatch (yes or no): no    5) Repeated 02 desaturation less than 90% (yes or no): yes, resolved    Anesthesia notified? (yes or no): no    Any of the above events occuring repeatedly in separate 30 minute intervals may be considered recurrent PACU respiratory events.    Patient stable and meets phase 1 discharge criteria for transport from PACU.   Report given  to Saida PANTOJA.

## 2025-04-13 NOTE — PROGRESS NOTES
Preventing Falls in the Hospital (02:42)  Your health professional recommends that you watch this short online health video.  Find out why you're at risk for falling in the hospital and how to prevent falls.   Purpose: Understand what increases a person's risk of falling in the hospital and how to prevent falls.  Goal: Understand what increases a person's risk of falling in the hospital and how to prevent falls.    Watch: Scan the QR code or visit the link to view video       [email or web address]  Current as of: July 17, 2023  Content Version: 14.2 2024 [facility] [employer].   Care instructions adapted under license by your healthcare professional. If you have questions about a medical condition or this instruction, always ask your healthcare professional. [employer] disclaims any warranty or liability for your use of this information.  Preventing Falls in the Hospital         Patient watched fall video via own I Phone.  Patient understands and verbalizes importance of using call light and asking for help.

## 2025-04-13 NOTE — ANESTHESIA CARE TRANSFER NOTE
Patient: August Mckeon    Procedure: Procedure(s):  APPENDECTOMY, LAPAROSCOPIC       Diagnosis: Ruptured appendicitis [K35.32]  Diagnosis Additional Information: No value filed.    Anesthesia Type:   General     Note:    Oropharynx: oropharynx clear of all foreign objects and spontaneously breathing  Level of Consciousness: awake  Oxygen Supplementation: face mask  Level of Supplemental Oxygen (L/min / FiO2): 8  Independent Airway: airway patency satisfactory and stable  Dentition: dentition unchanged  Vital Signs Stable: post-procedure vital signs reviewed and stable  Report to RN Given: handoff report given  Patient transferred to: PACU    Handoff Report: Identifed the Patient, Identified the Reponsible Provider, Reviewed the pertinent medical history, Discussed the surgical course, Reviewed Intra-OP anesthesia mangement and issues during anesthesia, Set expectations for post-procedure period and Allowed opportunity for questions and acknowledgement of understanding    Vitals:  Vitals Value Taken Time   /95 04/13/25 1642   Temp     Pulse 89 04/13/25 1649   Resp 8 04/13/25 1649   SpO2 96 % 04/13/25 1649   Vitals shown include unfiled device data.    Electronically Signed By: LIANNA Winkler CRNA  April 13, 2025  4:50 PM

## 2025-04-13 NOTE — ED PROVIDER NOTES
EMERGENCY DEPARTMENT ENCOUNTER      NAME: August Mckeon  AGE: 44 year old male  YOB: 1980  MRN: [id number]  EVALUATION DATE & TIME: 4/13/2025 10:18 AM    PCP: No Ref-Primary, Physician    ED PROVIDER: Roland Batista PA-C       CHIEF COMPLAINT:  Chief Complaint   Patient presents with    Abdominal Pain         HPI  August Mckeon is a pleasant 44 year old male who presents to the ER via private vehicle with his spouse for evaluation of right lower quad abdominal pain.  Patient states that pain began Friday and has worsened.  Originally abdominal crampy-like pain now constant pain.  Patient states having moderate-severe pain localized in the right lower quadrant.  No radiation.  Patient with loss of appetite.  Has not eaten since last night.  Feeling nauseated but no vomiting.  No constipation, diarrhea, or bloody/melanotic stools.  Denies any urinary symptoms.  No history of kidney stones.  No prior abdominal surgeries.  Pain worse with movement and better with lying still.  Patient is concerned for possible appendicitis.      REVIEW OF SYSTEMS   Review of Systems  As above, otherwise ROS is unremarkable.      PAST MEDICAL HISTORY:  Past Medical History:   Diagnosis Date    Closed dislocation of acromioclavicular joint     4/16/2015    Other postprocedural states     4/22/2015    Personal history of other medical treatment (CODE)     No Comments Provided         PAST SURGICAL HISTORY:  Past Surgical History:   Procedure Laterality Date    ARTHROSCOPY KNEE WITH MENISCECTOMY Right 9/1/2020    Procedure: MENISCECTOMY, KNEE, ARTHROSCOPIC;  Surgeon: August Brooks MD;  Location:  OR    OTHER SURGICAL HISTORY      4/22/2015,[id number],OTHER,Right    OTHER SURGICAL HISTORY      4/22/2015,[id number],OTHER,Right,[employer] equipment used         CURRENT MEDICATIONS:    Current Outpatient Medications   Medication Instructions    clonazePAM (KLONOPIN) 0.5 mg, Oral, 2 TIMES DAILY PRN  "        ALLERGIES:  No Known Allergies      FAMILY HISTORY:  Family History   Problem Relation Age of Onset    Family History Negative Mother         Good Health    Diabetes Father         Diabetes    Cancer Other         Cancer,FHx Lung cancer in 3 grandparents.         SOCIAL HISTORY:   Social History     Socioeconomic History    Marital status:    Tobacco Use    Smoking status: Never    Smokeless tobacco: Never   Substance and Sexual Activity    Drug use: Never     Types: Other     Comment: Drug use: Not Asked   Social History Narrative    .  Works at [employer].    Preload  10/22/2013         ==================================================================================================================================    PHYSICAL EXAM    VITAL SIGNS: BP (!) 149/79   Pulse 92   Temp 101.3  F (38.5  C) (Tympanic)   Resp 18   Ht 1.854 m (6' 1\")   Wt 129.3 kg (285 lb)   SpO2 95%   BMI 37.60 kg/m      Patient Vitals for the past 24 hrs:   BP Temp Temp src Pulse Resp SpO2 Height Weight   04/13/25 1215 -- -- -- -- -- 95 % -- --   04/13/25 1200 (!) 149/79 -- -- -- -- -- -- --   04/13/25 1100 -- -- -- -- -- 94 % -- --   04/13/25 1025 (!) 152/96 101.3  F (38.5  C) Tympanic 92 18 93 % -- --   04/13/25 1022 (!) 152/96 101.3  F (38.5  C) Tympanic 90 18 95 % 1.854 m (6' 1\") 129.3 kg (285 lb)       Physical Exam  Vital signs reviewed.  Nursing note reviewed    Constitutional: Alert with normal appearance.  Not ill-appearing, diaphoretic, or in acute distress.  Patient appeared to be discomfort from pain.  Nose: Normal appearance  Mouth/throat: Moist.  Clear.  Eyes: Conjunctivae normal appearing  Neck: Range of motion normal.  Supple.  Cardiovascular: Normal rate.  Regular rhythm.  Pulses and heart sounds normal.  No murmurs, gallops, or rubs.  Capillary refill less than 2 seconds  Pulmonary: Effort normal.  Breath sounds normal without wheezes, rales, or rhonchi.  No chest wall tenderness  Abdomen: " Flat, soft, normal bowel sounds.  Patient with right lower quad abdominal tenderness to McBurney point tenderness.  Guarding present.  No rebound tenderness or referred pain.  No flank or CVA tenderness.  Musculoskeletal: Range of motion normal  Skin: Warm and dry  Neurological: No focal deficits.  Alert and oriented x3  Psychiatric/behavioral: Normal mood      ==================================================================================================================================     LABS & RADIOLOGY:  All pertinent labs reviewed and interpreted. Reviewed all pertinent imaging. Please see official radiology report.  Results for orders placed or performed during the hospital encounter of 04/13/25   CT Abdomen Pelvis w Contrast    Impression    IMPRESSION:   1.  Acute retrocecal ruptured appendicitis. No abscess formation.  2.  Moderate to marked fatty infiltration of the liver.  3.  19 x 13 mm left adrenal nodule. This is indeterminate but likely represents a benign adenoma. A follow-up adrenal protocol CT or MRI can be considered in one year. Laboratory evaluation can be considered if the patient has signs or symptoms of a   hyperfunctioning adrenal nodule.      REFERENCE:  Management of Incidental Adrenal Masses: A White Paper of the ACR Incidental Findings Committee. J Am Gregorio Radiol 2017;14:2069-6549.    ? 1 cm and < 4 cm:     No prior imaging and no history of cancer:  1-2 cm: probably benign. Consider 12-month CT adrenal follow-up.  >2cm, <4 cm: Adrenal CT.    No prior imaging and history of cancer: Adrenal CT    Prior Imaging:  Stable for 1 year: Benign. No follow-up.  New or enlarging:  --Adrenal CT or resection if no history of cancer.  --PET/CT or biopsy if positive history of cancer.                  Comprehensive metabolic panel   Result Value Ref Range    Sodium 135 135 - 145 mmol/L    Potassium 4.2 3.4 - 5.3 mmol/L    Carbon Dioxide (CO2) 24 22 - 29 mmol/L    Anion Gap 10 7 - 15 mmol/L    Urea  Nitrogen 14.4 6.0 - 20.0 mg/dL    Creatinine 1.12 0.67 - 1.17 mg/dL    GFR Estimate 83 >60 mL/min/1.73m2    Calcium 8.9 8.8 - 10.4 mg/dL    Chloride 101 98 - 107 mmol/L    Glucose 134 (H) 70 - 99 mg/dL    Alkaline Phosphatase 38 (L) 40 - 150 U/L    AST 28 0 - 45 U/L    ALT 51 0 - 70 U/L    Protein Total 7.1 6.4 - 8.3 g/dL    Albumin 4.3 3.5 - 5.2 g/dL    Bilirubin Total 1.4 (H) <=1.2 mg/dL   Result Value Ref Range    Lipase 16 13 - 60 U/L   Lactic acid whole blood with 1x repeat in 2 hr when >2   Result Value Ref Range    Lactic Acid, Initial 1.2 0.7 - 2.0 mmol/L   Result Value Ref Range    Magnesium 1.8 1.7 - 2.3 mg/dL   Result Value Ref Range    CRP Inflammation 95.27 (H) <5.00 mg/L   UA with Microscopic reflex to Culture    Specimen: Urine, Midstream   Result Value Ref Range    Color Urine Yellow Colorless, Straw, Light Yellow, Yellow    Appearance Urine Clear Clear    Glucose Urine Negative Negative mg/dL    Bilirubin Urine Negative Negative    Ketones Urine Negative Negative mg/dL    Specific Gravity Urine 1.040 (H) 1.000 - 1.030    Blood Urine Negative Negative    pH Urine 6.5 5.0 - 9.0    Protein Albumin Urine Negative Negative mg/dL    Urobilinogen Urine Normal Normal mg/dL    Nitrite Urine Negative Negative    Leukocyte Esterase Urine Negative Negative    RBC Urine <1 <=2 /HPF    WBC Urine <1 <=5 /HPF   CBC with platelets and differential   Result Value Ref Range    WBC Count 16.8 (H) 4.0 - 11.0 10e3/uL    RBC Count 5.00 4.40 - 5.90 10e6/uL    Hemoglobin 14.8 13.3 - 17.7 g/dL    Hematocrit 41.7 40.0 - 53.0 %    MCV 83 78 - 100 fL    MCH 29.6 26.5 - 33.0 pg    MCHC 35.5 31.5 - 36.5 g/dL    RDW 12.8 10.0 - 15.0 %    Platelet Count 163 150 - 450 10e3/uL    % Neutrophils 88 %    % Lymphocytes 6 %    % Monocytes 5 %    % Eosinophils 0 %    % Basophils 0 %    % Immature Granulocytes 0 %    NRBCs per 100 WBC 0 <1 /100    Absolute Neutrophils 14.9 (H) 1.6 - 8.3 10e3/uL    Absolute Lymphocytes 1.0 0.8 - 5.3 10e3/uL     Absolute Monocytes 0.9 0.0 - 1.3 10e3/uL    Absolute Eosinophils 0.0 0.0 - 0.7 10e3/uL    Absolute Basophils 0.0 0.0 - 0.2 10e3/uL    Absolute Immature Granulocytes 0.1 <=0.4 10e3/uL    Absolute NRBCs 0.0 10e3/uL   Extra Blue Top Tube   Result Value Ref Range    Hold Specimen JIC    Extra Red Top Tube   Result Value Ref Range    Hold Specimen JIC    Hemoglobin A1c   Result Value Ref Range    Estimated Average Glucose 105 <117 mg/dL    Hemoglobin A1C 5.3 <5.7 %     CT Abdomen Pelvis w Contrast   Final Result   IMPRESSION:    1.  Acute retrocecal ruptured appendicitis. No abscess formation.   2.  Moderate to marked fatty infiltration of the liver.   3.  19 x 13 mm left adrenal nodule. This is indeterminate but likely represents a benign adenoma. A follow-up adrenal protocol CT or MRI can be considered in one year. Laboratory evaluation can be considered if the patient has signs or symptoms of a    hyperfunctioning adrenal nodule.         REFERENCE:   Management of Incidental Adrenal Masses: A White Paper of the ACR Incidental Findings Committee. J Am Gregorio Radiol 2017;14:2383-7804.      ? 1 cm and < 4 cm:       No prior imaging and no history of cancer:   1-2 cm: probably benign. Consider 12-month CT adrenal follow-up.   >2cm, <4 cm: Adrenal CT.      No prior imaging and history of cancer: Adrenal CT      Prior Imaging:   Stable for 1 year: Benign. No follow-up.   New or enlarging:   --Adrenal CT or resection if no history of cancer.   --PET/CT or biopsy if positive history of cancer.                                  ==================================================================================================================================    ED COURSE, MEDICAL DECISION MAKING, FINAL IMPRESSION AND PLAN:      Assessment / Plan:  1. Sepsis (H)    2. Ruptured appendicitis    3. Left adrenal mass    4. Fatty liver        The patient was interviewed and examined.  HPI and physical exam as below.  Differential  "diagnosis and MDM Key Documentation Elements as below.  Vitals, triage note, and nursing notes were reviewed.BP (!) 149/79   Pulse 92   Temp 101.3  F (38.5  C) (Tympanic)   Resp 18   Ht 1.854 m (6' 1\")   Wt 129.3 kg (285 lb)   SpO2 95%   BMI 37.60 kg/m      Differential includes but is not limited to appendicitis, UTI, pyelonephritis, nephrolithiasis, bowel obstruction, colitis    Patient febrile temperature 101.3.  Patient borderline tachycardic with pulse of 92.  Blood pressure elevated 152/96.  Patient was not septic appearing.  Patient with localized right lower quadrant abdominal pain over the McBurney point.  Guarding present.    Blood cultures obtained.  Sepsis protocols initiated.  Patient started on 30 cc/kg for 2397 mL of normal saline.  Patient started on IV Zosyn.  Patient given IV Toradol, oral Tylenol, and IV Dilaudid for fever and pain.  Pain improved.  Blood pressures remained stable.  Did not require any vasopressors.    CT abdomen pelvis today showed acute retrocecal ruptured appendicitis without signs of abscess.  Incidental fatty liver and a 19 x 13 cm left adrenal nodule were found.  Patient will need repeat dedicated adrenal gland CT or MR in 1 year for reevaluation of left adrenal gland nodule.    Discussed with general surgery, Dr. Puentes.  Plans for patient to be brought to the OR.     Pertinent Labs & Imaging studies reviewed. (See chart for details)  Results for orders placed or performed during the hospital encounter of 04/13/25   CT Abdomen Pelvis w Contrast    Impression    IMPRESSION:   1.  Acute retrocecal ruptured appendicitis. No abscess formation.  2.  Moderate to marked fatty infiltration of the liver.  3.  19 x 13 mm left adrenal nodule. This is indeterminate but likely represents a benign adenoma. A follow-up adrenal protocol CT or MRI can be considered in one year. Laboratory evaluation can be considered if the patient has signs or symptoms of a   hyperfunctioning " adrenal nodule.      REFERENCE:  Management of Incidental Adrenal Masses: A White Paper of the ACR Incidental Findings Committee. J Am Gregorio Radiol 2017;14:0890-7499.    ? 1 cm and < 4 cm:     No prior imaging and no history of cancer:  1-2 cm: probably benign. Consider 12-month CT adrenal follow-up.  >2cm, <4 cm: Adrenal CT.    No prior imaging and history of cancer: Adrenal CT    Prior Imaging:  Stable for 1 year: Benign. No follow-up.  New or enlarging:  --Adrenal CT or resection if no history of cancer.  --PET/CT or biopsy if positive history of cancer.                  Comprehensive metabolic panel   Result Value Ref Range    Sodium 135 135 - 145 mmol/L    Potassium 4.2 3.4 - 5.3 mmol/L    Carbon Dioxide (CO2) 24 22 - 29 mmol/L    Anion Gap 10 7 - 15 mmol/L    Urea Nitrogen 14.4 6.0 - 20.0 mg/dL    Creatinine 1.12 0.67 - 1.17 mg/dL    GFR Estimate 83 >60 mL/min/1.73m2    Calcium 8.9 8.8 - 10.4 mg/dL    Chloride 101 98 - 107 mmol/L    Glucose 134 (H) 70 - 99 mg/dL    Alkaline Phosphatase 38 (L) 40 - 150 U/L    AST 28 0 - 45 U/L    ALT 51 0 - 70 U/L    Protein Total 7.1 6.4 - 8.3 g/dL    Albumin 4.3 3.5 - 5.2 g/dL    Bilirubin Total 1.4 (H) <=1.2 mg/dL   Result Value Ref Range    Lipase 16 13 - 60 U/L   Lactic acid whole blood with 1x repeat in 2 hr when >2   Result Value Ref Range    Lactic Acid, Initial 1.2 0.7 - 2.0 mmol/L   Result Value Ref Range    Magnesium 1.8 1.7 - 2.3 mg/dL   Result Value Ref Range    CRP Inflammation 95.27 (H) <5.00 mg/L   UA with Microscopic reflex to Culture    Specimen: Urine, Midstream   Result Value Ref Range    Color Urine Yellow Colorless, Straw, Light Yellow, Yellow    Appearance Urine Clear Clear    Glucose Urine Negative Negative mg/dL    Bilirubin Urine Negative Negative    Ketones Urine Negative Negative mg/dL    Specific Gravity Urine 1.040 (H) 1.000 - 1.030    Blood Urine Negative Negative    pH Urine 6.5 5.0 - 9.0    Protein Albumin Urine Negative Negative mg/dL     "Urobilinogen Urine Normal Normal mg/dL    Nitrite Urine Negative Negative    Leukocyte Esterase Urine Negative Negative    RBC Urine <1 <=2 /HPF    WBC Urine <1 <=5 /HPF   CBC with platelets and differential   Result Value Ref Range    WBC Count 16.8 (H) 4.0 - 11.0 10e3/uL    RBC Count 5.00 4.40 - 5.90 10e6/uL    Hemoglobin 14.8 13.3 - 17.7 g/dL    Hematocrit 41.7 40.0 - 53.0 %    MCV 83 78 - 100 fL    MCH 29.6 26.5 - 33.0 pg    MCHC 35.5 31.5 - 36.5 g/dL    RDW 12.8 10.0 - 15.0 %    Platelet Count 163 150 - 450 10e3/uL    % Neutrophils 88 %    % Lymphocytes 6 %    % Monocytes 5 %    % Eosinophils 0 %    % Basophils 0 %    % Immature Granulocytes 0 %    NRBCs per 100 WBC 0 <1 /100    Absolute Neutrophils 14.9 (H) 1.6 - 8.3 10e3/uL    Absolute Lymphocytes 1.0 0.8 - 5.3 10e3/uL    Absolute Monocytes 0.9 0.0 - 1.3 10e3/uL    Absolute Eosinophils 0.0 0.0 - 0.7 10e3/uL    Absolute Basophils 0.0 0.0 - 0.2 10e3/uL    Absolute Immature Granulocytes 0.1 <=0.4 10e3/uL    Absolute NRBCs 0.0 10e3/uL   Extra Blue Top Tube   Result Value Ref Range    Hold Specimen JIC    Extra Red Top Tube   Result Value Ref Range    Hold Specimen JIC    Hemoglobin A1c   Result Value Ref Range    Estimated Average Glucose 105 <117 mg/dL    Hemoglobin A1C 5.3 <5.7 %     No results found for: \"ABORH\"      Reassessments, Medications, Interventions, & Response to Treatments:  -as above    Medications given during today's ER visit:  Medications   ondansetron (ZOFRAN) injection 4 mg (has no administration in time range)   HYDROmorphone (PF) (DILAUDID) injection 0.5 mg (has no administration in time range)   acetaminophen (TYLENOL) tablet 650 mg (650 mg Oral $Given 4/13/25 1101)     Or   acetaminophen (TYLENOL) Suppository 650 mg ( Rectal See Alternative 4/13/25 1101)   piperacillin-tazobactam (ZOSYN) 4.5 g vial to attach to  mL bag (has no administration in time range)   sodium chloride 0.9% BOLUS 1,000 mL (0 mLs Intravenous Stopped 4/13/25 9201) "   ketorolac (TORADOL) injection 30 mg (30 mg Intravenous $Given 4/13/25 1051)   piperacillin-tazobactam (ZOSYN) 4.5 g vial to attach to  mL bag (0 g Intravenous Stopped 4/13/25 1206)   sodium chloride 0.9% BOLUS 1,397 mL (1,397 mLs Intravenous $New Bag 4/13/25 1049)   sodium chloride 0.9 % bag for CT scan flush (60 mLs As instructed $Given 4/13/25 1159)   iopamidol (ISOVUE-370) solution 150 mL (150 mLs Intravenous $Given 4/13/25 1159)       Consultations:  Dr. Puentes-general surgery    Decision Rules, Medical Calculators, Sepsis Criteria, and Risk Stratification Tools:  The patient has signs of sepsis   Sepsis ED evaluation   The patient has signs of sepsis as evidenced by:  1. Presence of 2 SIRS criteria, suspected infection, AND  2. Organ dysfunction: Sepsis work up in progress. Will continue to monitor for signs of organ dysfunction    Sepsis Care Initiation: Starting at 2:11 PM  on 04/13/25, until specified. Prior to this documentation, sepsis, severe sepsis, or septic shock was NOT thought to be a significant cause of illness. This order represents the first time infection was seriously considered to be affecting the patient.    Lactic Acid Results:  Recent Labs   Lab Test 04/13/25  1038   LACT 1.2       3 Hour Bundle 6 Hour Bundle (Reassessment)   Blood Cultures before IV Antibiotics: Yes  Antibiotics given: see below  Prehospital fluid volume (mL):                     Total fluids given (ED +Pre-hospital):  Full 30 mL/kg bolus given based on ideal weight: 2397 ml   Repeat Lactic Acid Level: Ordered by reflex for 2 hours after initial lactic acid collection.  Vasopressors: MAP>65 after initial IVF bolus, will continue to monitor fluid status and vital signs.  Repeat perfusion exam: I attest to having performed a repeat sepsis exam and assessment of perfusion at 2:12 PM .   BMI Readings from Last 1 Encounters:   04/13/25 37.60 kg/m        Anti-infectives (From admission through now)      Start      "Dose/Rate Route Frequency Ordered Stop    04/13/25 1040  piperacillin-tazobactam (ZOSYN) 4.5 g vial to attach to  mL bag        Note to Pharmacy: For [facility], [facility] and [facility]: For Zosyn-naive patients, use the \"Zosyn initial dose + extended infusion\" order panel.    4.5 g  over 30 Minutes Intravenous ONCE 04/13/25 1036 04/13/25 1206                  MDM Key Documentation Elements for Patient's Evaluation:  Differential diagnosis to include high risk considerations: As above  Escalation to admission/observation considered: Admission/observation considered, patient was admitted  Discussions and management with other clinicians:    3a. Independent interpretation of testing performed by another health professional:  -Yes  3b. Discussion of management or test interpretation with another health professional: -Yes  Independent interpretation of tests:  Ordering and/or review of 3+ test(s)  Discussion of test interpretations with radiology:  No  History obtained from source other than patient or assessment requiring an independent historian:  No  Review of non-ED/external records:  review of 3+ records  Diagnostic tests considered but not ultimately performed/deferred:  - MR abdomen   Prescription medications considered but not prescribed:  - Patient was admitted/transferred   Chronic conditions affecting care:  -None   Care affected by social determinants of health:  -None    The patient's management involved:   - Laboratory studies  - Imaging studies  - Parenteral controlled substance  - Decision regarding hospitalization/transfer        A shared decision making model was used. Time was taken to answer all questions.  Patient and/or associated parties understood and were agreeable to treatment plan.  Plan and all results were discussed.  Patient given copy results.  Discussed fatty liver and left adrenal gland nodule.  Patient was admitted/transferred in stable condition      NEW PRESCRIPTIONS STARTED AT TODAY'S ER " VISIT:  New Prescriptions    No medications on file          ==================================================================================================================================      I, Carmelo Batista PA-C, personally performed the services described in this documentation, and it is both accurate and complete.       Roland Batista PA-C  04/13/25 1412

## 2025-04-13 NOTE — PROGRESS NOTES
Incentive Spirometry education completed.  Pt goal 2500 mls.  Pt achieved 2500 mls.  Pt instructed to perform 10/hr while awake with at least one deep breath and cough per hour until able to perform baseline activity.  How to use an Incentive Spirometer written instructions provided to patient. RT will follow and re-assess as need.      Lainey, RT on 4/13/2025 at 6:25 PM

## 2025-04-13 NOTE — H&P
GENERAL SURGERY CONSULTATION NOTE    August Mckeon   [address] [facility] [id number]  44 year old  male    Primary Care Provider:  No Ref-Primary, Physician      HPI: August Mckeon presents to the emergency department with abdominal pain.  Patient states pain began on Friday evening, approximately 2 days ago with crampy lower abdominal pain over the next couple of days the pain became worse and has now migrated to the right lower quadrant.  Does have nausea, no vomiting.  No significant cardiopulmonary history.  Tolerated general athletic in the past.  Normal exercise tolerance.    REVIEW OF SYSTEMS:    GENERAL: No fevers or chills. Denies fatigue, recent weight loss.  HEENT: No sinus drainage. No changes with vision or hearing. No difficulty swallowing.   LYMPHATICS:  Noswollen nodes in axilla, neck or groin.  CARDIOVASCULAR: Denies chest pain, palpitations and dyspnea on exertion.  PULMONARY: No shortness of breath or cough. No increase in sputum production.  GI: Denies melena,bright red blood in stools. No hematemesis. No constipation or diarrhea.  : No dysuria or hematuria.  SKIN: No recent rashes or ulcers.   HEMATOLOGY:  No history of easy bruising or bleeding.  ENDOCRINE:  No history of diabetes or thyroid problems.  NEUROLOGY:  No history of seizures or headaches. No motor or sensory changes.        Patient Active Problem List   Diagnosis    Tear of medial meniscus of right knee, current, unspecified tear type, subsequent encounter    AC separation    S/P shoulder surgery    Morbid obesity (H)    Ruptured appendicitis    Left adrenal mass    Fatty liver       Past Medical History:   Diagnosis Date    Closed dislocation of acromioclavicular joint     4/16/2015    Other postprocedural states     4/22/2015    Personal history of other medical treatment (CODE)     No Comments Provided       Past Surgical History:   Procedure Laterality Date    ARTHROSCOPY KNEE WITH MENISCECTOMY Right 9/1/2020     Procedure: MENISCECTOMY, KNEE, ARTHROSCOPIC;  Surgeon: August Brooks MD;  Location: [facility] OR    OTHER SURGICAL HISTORY      4/22/2015,[id number],OTHER,Right    OTHER SURGICAL HISTORY      4/22/2015,[id number],OTHER,Right,Arthrex equipment used       Family History   Problem Relation Age of Onset    Family History Negative Mother         Good Health    Diabetes Father         Diabetes    Cancer Other         Cancer,FHx Lung cancer in 3 grandparents.       Social History     Social History Narrative    .  Works at [employer].    Preload  10/22/2013       Social History     Socioeconomic History    Marital status:      Spouse name: Not on file    Number of children: Not on file    Years of education: Not on file    Highest education level: Not on file   Occupational History    Not on file   Tobacco Use    Smoking status: Never    Smokeless tobacco: Never   Substance and Sexual Activity    Alcohol use: Not on file    Drug use: Never     Types: Other     Comment: Drug use: Not Asked    Sexual activity: Not on file   Other Topics Concern    Parent/sibling w/ CABG, MI or angioplasty before 65F 55M? Not Asked   Social History Narrative    .  Works at [employer].    Preload  10/22/2013     Social Drivers of Health     Financial Resource Strain: Not on file   Food Insecurity: Not on file   Transportation Needs: Not on file   Physical Activity: Not on file   Stress: Not on file   Social Connections: Not on file   Interpersonal Safety: Not on file   Housing Stability: Not on file       Current Facility-Administered Medications   Medication Dose Route Frequency Provider Last Rate Last Admin    acetaminophen (TYLENOL) tablet 650 mg  650 mg Oral Once PRN Roland Batista PA-C   650 mg at 04/13/25 1101    Or    acetaminophen (TYLENOL) Suppository 650 mg  650 mg Rectal Once PRN Roland Batista PA-C        HYDROmorphone (PF) (DILAUDID) injection 0.5 mg  0.5 mg Intravenous Q30 Min PRN  "Roland Batista PA-C        ondansetron (ZOFRAN) injection 4 mg  4 mg Intravenous Q30 Min PRN Roland Batista PA-C        piperacillin-tazobactam (ZOSYN) 4.5 g vial to attach to  mL bag  4.5 g Intravenous Q6H Roland Batista PA-C         Current Outpatient Medications   Medication Sig Dispense Refill    clonazePAM (KLONOPIN) 0.5 MG tablet Take 1 tablet (0.5 mg) by mouth 2 times daily as needed for anxiety 10 tablet 3         ALLERGIES/SENSITIVITIES: No Known Allergies    PHYSICAL EXAM:     BP (!) 157/91   Pulse 83   Temp 101.3  F (38.5  C) (Tympanic)   Resp 18   Ht 1.854 m (6' 1\")   Wt 129.3 kg (285 lb)   SpO2 100%   BMI 37.60 kg/m      General Appearance:   Sitting up in the chair, no apparent distress  HEENT: Pupils are equal and reactive, no scleral icterus,   Heart & CV:  RRR no murmur.  Intact distal pulses, good cap refill.  LUNGS: No increased work of breathing.Lugns are CTA B/L, no wheezing or crackles.  Abd: Obese abdomen soft, mildly distended.  Tenderness with guarding the right lower quadrant.  Ext: Normal bulk and tone, no lower extremity edema   Neuro: alert and oriented, normal speech and mentation    Labs are reviewed and are significant for sodium 135, potassium 4.2, creatinine 1.12, magnesium 1.8, albumin 4.3, WBC 16.8, hemoglobin 14.8, platelets 163    CT scan abdomen pelvis significant for acute appendicitis in the retrocecal position with apparent rupture.  No abscess.  No gross free air        CONSULTATION ASSESSMENT AND PLAN:    44 year old male with acute, ruptured appendicitis.  The technical details of laparoscopic appendectomy, possible ileocecectomy were discussed with the patient along with the risks and benefits include bleeding, infection, staple line leak, possible ostomy, injury to surrounding structures including colon, small intestine, omentum, ureter, other intra-abdominal structures.  Patient demonstrates understanding is willing to " proceed.    Will plan to continue Zosyn postoperatively  N.p.o.  Scheduled for above procedure today, 4/13/2025    Tyler Puentes MD on 4/13/2025 at 2:28 PM

## 2025-04-13 NOTE — PROVIDER NOTIFICATION
04/13/25 1749   Valuables   Patient Belongings remains with patient   Patient Belongings Remaining with Patient cell phone/electronics;clothing;jewelry;other (see comments)  (Ring.charging cord.)   Did you bring any home meds/supplements to the hospital?  No     A               Admission:  I am responsible for any personal items that are not sent to the safe or pharmacy.  [facility] is not responsible for loss, theft or damage of any property in my possession.    Signature:  _________________________________ Date: _______  Time: _____                                              Staff Signature:  ____________________________ Date: ________  Time: _____      2nd Staff person, if patient is unable/unwilling to sign:    Signature: ________________________________ Date: ________  Time: _____     Discharge:  [facility] has returned all of my personal belongings:    Signature: _________________________________ Date: ________  Time: _____                                          Staff Signature:  ____________________________ Date: ________  Time: _____

## 2025-04-13 NOTE — ANESTHESIA PROCEDURE NOTES
Airway         Procedure Start/Stop Times: 4/13/2025 3:39 PM  Staff -        CRNA: Laura Angel APRN CRNA       Performed By: CRNAIndications and Patient Condition       Indications for airway management: airway protection       Induction type:intravenous       Mask difficulty assessment: 2 - vent by mask + OA or adjuvant +/- NMBA    Final Airway Details       Final airway type: endotracheal airway       Successful airway: ETT - single  Endotracheal Airway Details        ETT size (mm): 8.0       Cuffed: yes       Cuff volume (mL): 9       Successful intubation technique: video laryngoscopy       VL Blade Size: Glidescope 4       Grade View of Cords: 1       Adjucts: stylet       Position: Center       Measured from: gums/teeth       Secured at (cm): 22       Bite block used: None    Post intubation assessment        Placement verified by: capnometry, equal breath sounds and chest rise        Number of attempts at approach: 1       Number of other approaches attempted: 0       Secured with: tape       Ease of procedure: easy       Dentition: Intact    Medication(s) Administered   Medication Administration Time: 4/13/2025 3:39 PM

## 2025-04-13 NOTE — PROVIDER NOTIFICATION
04/13/25 1749   Valuables   Patient Belongings remains with patient   Patient Belongings Remaining with Patient cell phone/electronics;clothing;jewelry;other (see comments)  (Ring.charging cord.lap top.)   Did you bring any home meds/supplements to the hospital?  No     A               Admission:  I am responsible for any personal items that are not sent to the safe or pharmacy.  [facility] is not responsible for loss, theft or damage of any property in my possession.    Signature:  _________________________________ Date: _______  Time: _____                                              Staff Signature:  ____________________________ Date: ________  Time: _____      2nd Staff person, if patient is unable/unwilling to sign:    Signature: ________________________________ Date: ________  Time: _____     Discharge:  [facility] has returned all of my personal belongings:    Signature: _________________________________ Date: ________  Time: _____                                          Staff Signature:  ____________________________ Date: ________  Time: _____

## 2025-04-13 NOTE — ED TRIAGE NOTES
"Pt presents with his wife for concern of abdominal, states to onset 3 days ago and worsening. Initially was diffuse cramping type pains, now more constant, more  intense, in the RLQ. With nausea, no vomiting or diarrhea, last BM yesterday was WNL. Also notes having dark urine without pain. NPO since dinner last night. Tylenol last night with very minimal relief.     BP (!) 152/96   Pulse 90   Temp 101.3  F (38.5  C) (Tympanic)   Resp 18   Ht 1.854 m (6' 1\")   Wt 129.3 kg (285 lb)   SpO2 95%   BMI 37.60 kg/m         Triage Assessment (Adult)       Row Name 04/13/25 1023          Triage Assessment    Airway WDL WDL        Respiratory WDL    Respiratory WDL WDL        Skin Circulation/Temperature WDL    Skin Circulation/Temperature WDL WDL        Cardiac WDL    Cardiac WDL WDL        Peripheral/Neurovascular WDL    Peripheral Neurovascular WDL WDL        Cognitive/Neuro/Behavioral WDL    Cognitive/Neuro/Behavioral WDL WDL                     "

## 2025-04-13 NOTE — OP NOTE
PREOPERATIVE  DIAGNOSIS: Acute appendicitis with localized peritonitis, perforated     POSTOPERATIVE DIAGNOSIS: Acute appendicitis with localized peritonitis, perforated. Drainage of small abdominal abscess    PROCEDURE PERFORMED:  Laparoscopic appendectomy       SURGEON:  Tyler Puentes MD     ASSISTANT: Circulator: Karine Stevens RN  Scrub Person: Pema Lane  First Assistant: Yocasta Angel RN    ANESTHESIA: GeneralCRNA Independent: Laura Angel APRN CRNA    FAMILYPHYSICIAN: No Ref-Primary, Physician      INDICATION FOR THE PROCEDURE:  The patient is a 44 year old y.o. male with acute appendicitis, appears to be ruptured on CT scan.        PROCEDURE:  August Mckeon was brought to the operating room placed in supine position.    General anesthetic was applied and the patient was intubated.  Greenberg catheter was placed.  Patient was prepped and draped in usual sterile fashion.  Timeout was performed and everyone was in agreement to proceed.  The supraumbilical area was anesthetized with local anesthetic.  A 1cm transverse incision was made. The subcutaneous tissues were dissected with hemostat clamp down to the level of the fascia.  The umbilical stalk was grasped and elevated. The Veress needle was placed in the abdomen and drop test confirmed abdominal placement.  Insufflation was applied to the abdomen and the abdomen was insufflated to 15 mmHg.  The 5 mm 30 degree scope was placed in an Optiview trocar and this set up was slowly advanced through the incision into the abdomen under direct vision. The obturator was removed from the port and the camera was reinserted the abdomen and there did not appear to be any injury to the underlying bowel.   Local anesthetic was infiltrated in the mid lower abdomen and the left lower abdomen. Skin incisions were made and under direct visualization trocars were positioned. The cecum was grasped and elevated.  The appendix was visible lateral to the cecum.  The  base of appendix was easily visualized as the appendix then turned posteriorly into the retrocecal position, as seen on CT scan.  A window was made in the mesentery at the base of the appendix, while the appendix was still stuck down.  But, it was in a  Angle to staple off the base of the appendix.  A laparoscopic stapler was then introduced into the abdomen and the appendix was stapled from the cecum, tissue appeared only mildly inflamed at the level of excision.  The appendix was then free from the cecum and I was able to mobilize this a bit more.  I encountered a small abscess that is drained with the suction irrigating device. the mesoappendix was quite stuck down and try to mobilize it I ran into some bleeding, likely tore the appendiceal artery.  I then brought a ligature onto the field and used this to take down the appendiceal mesentery, this also controlled the bleeding.  The appendix was then freed from the mesenteric attachments and placed in a laparoscopic retrieval bag.  The retrieval bag was then removed through the left lateral abdominal port.  The right lower quadrant was irrigated with a small amount of saline and returns were clear.  No obvious bleeding at the surgical site or the right colic gutter.  Staple line on the cecum appeared intact with no evidence of leakage or bleeding.  Patient was flattened out and the omentum was then back down into the right colic gutter.  The left lateral port was removed under direct vision and there was no bleeding.  The fascia was reapproximated with 0 Vicryl suture utilizing Cristhian-Tian needle to repair the fascial defect under direct vision.  The suprapubic port was then removed under direct vision and there was no bleeding.  The insufflation was let out of the abdomen and the umbilical port was removed as well.  Skin was then closed with 4-0 Monocryl suture and the incisions were cleansed and dried and dressed with skin glue.   At the end of the case all  sponge and needle counts were correct.  The patient tolerated procedure well. They were extubated in the OR and was taken to the recovery room in good condition.        EVANGELINA Puentes MD

## 2025-04-13 NOTE — ANESTHESIA PREPROCEDURE EVALUATION
Anesthesia Pre-Procedure Evaluation    Patient: August Mckeon   MRN: [id number] : 1980        Procedure : Procedure(s):  APPENDECTOMY, LAPAROSCOPIC          Past Medical History:   Diagnosis Date    Closed dislocation of acromioclavicular joint     2015    Other postprocedural states     2015    Personal history of other medical treatment (CODE)     No Comments Provided      Past Surgical History:   Procedure Laterality Date    ARTHROSCOPY KNEE WITH MENISCECTOMY Right 2020    Procedure: MENISCECTOMY, KNEE, ARTHROSCOPIC;  Surgeon: August Brooks MD;  Location: [facility] OR    OTHER SURGICAL HISTORY      2015,[id number],OTHER,Right    OTHER SURGICAL HISTORY      2015,[id number],OTHER,Right,Arthrex equipment used      No Known Allergies   Social History     Tobacco Use    Smoking status: Never    Smokeless tobacco: Never   Substance Use Topics    Alcohol use: Not on file      Wt Readings from Last 1 Encounters:   25 129.3 kg (285 lb)        Anesthesia Evaluation   Pt has had prior anesthetic. Type: General and MAC.    No history of anesthetic complications       ROS/MED HX  ENT/Pulmonary:     (+)     ORION risk factors,   obese,                                Neurologic:       Cardiovascular:     (+)  - -   -  - -                                 Previous cardiac testing   Echo: Date: Results:    Stress Test:  Date: Results:    ECG Reviewed:  Date: Results:  SB  Cath:  Date: Results:      METS/Exercise Tolerance: >4 METS    Hematologic:       Musculoskeletal:       GI/Hepatic:     (+)         appendicitis,    liver disease,       Renal/Genitourinary:       Endo:     (+)               Obesity,       Psychiatric/Substance Use:       Infectious Disease:       Malignancy:       Other:            Physical Exam    Airway        Mallampati: I   TM distance: > 3 FB   Neck ROM: full   Mouth opening: > 3 cm    Respiratory Devices and Support         Dental           Cardiovascular   cardiovascular exam  "normal          Pulmonary   pulmonary exam normal                OUTSIDE LABS:  CBC:   Lab Results   Component Value Date    WBC 16.8 (H) 04/13/2025    WBC 13.5 (H) 07/19/2021    HGB 14.8 04/13/2025    HGB 15.7 07/19/2021    HCT 41.7 04/13/2025    HCT 44.7 07/19/2021     04/13/2025     07/19/2021     BMP:   Lab Results   Component Value Date     04/13/2025     07/19/2021    POTASSIUM 4.2 04/13/2025    POTASSIUM 4.1 07/19/2021    CHLORIDE 101 04/13/2025    CHLORIDE 101 07/19/2021    CO2 24 04/13/2025    CO2 27 07/19/2021    BUN 14.4 04/13/2025    BUN 12 07/19/2021    CR 1.12 04/13/2025    CR 1.09 07/19/2021     (H) 04/13/2025     (H) 07/19/2021     COAGS: No results found for: \"PTT\", \"INR\", \"FIBR\"  POC: No results found for: \"BGM\", \"HCG\", \"HCGS\"  HEPATIC:   Lab Results   Component Value Date    ALBUMIN 4.3 04/13/2025    PROTTOTAL 7.1 04/13/2025    ALT 51 04/13/2025    AST 28 04/13/2025    ALKPHOS 38 (L) 04/13/2025    BILITOTAL 1.4 (H) 04/13/2025     OTHER:   Lab Results   Component Value Date    LACT 1.2 04/13/2025    A1C 5.3 04/13/2025    ADRIEN 8.9 04/13/2025    MAG 1.8 04/13/2025    LIPASE 16 04/13/2025    CRP 9.0 07/19/2021    SED 5 07/19/2021       Anesthesia Plan    ASA Status:  2, emergent    NPO Status:  NPO Appropriate    Anesthesia Type: General.     - Airway: ETT              Consents    Anesthesia Plan(s) and associated risks, benefits, and realistic alternatives discussed. Questions answered and patient/representative(s) expressed understanding.     - Discussed: Risks, Benefits and Alternatives for BOTH SEDATION and the PROCEDURE were discussed     - Discussed with:  Patient      - Extended Intubation/Ventilatory Support Discussed: No.      - Patient is DNR/DNI Status: No     Use of blood products discussed: No .     Postoperative Care    Pain management: Multi-modal analgesia.   PONV prophylaxis: Ondansetron (or other 5HT-3), Dexamethasone or Solumedrol " "    Comments:               LIANNA Winkler CRNA    Clinically Significant Risk Factors Present on Admission                             # Obesity: Estimated body mass index is 37.6 kg/m  as calculated from the following:    Height as of this encounter: 1.854 m (6' 1\").    Weight as of this encounter: 129.3 kg (285 lb).                "

## 2025-04-14 VITALS
BODY MASS INDEX: 41.75 KG/M2 | TEMPERATURE: 97.9 F | HEIGHT: 73 IN | SYSTOLIC BLOOD PRESSURE: 149 MMHG | OXYGEN SATURATION: 98 % | WEIGHT: 315 LBS | HEART RATE: 71 BPM | RESPIRATION RATE: 16 BRPM | DIASTOLIC BLOOD PRESSURE: 89 MMHG

## 2025-04-14 LAB
ANION GAP SERPL CALCULATED.3IONS-SCNC: 10 MMOL/L (ref 7–15)
BASOPHILS # BLD AUTO: 0 10E3/UL (ref 0–0.2)
BASOPHILS NFR BLD AUTO: 0 %
BUN SERPL-MCNC: 9.9 MG/DL (ref 6–20)
CALCIUM SERPL-MCNC: 8.6 MG/DL (ref 8.8–10.4)
CHLORIDE SERPL-SCNC: 105 MMOL/L (ref 98–107)
CREAT SERPL-MCNC: 0.99 MG/DL (ref 0.67–1.17)
EGFRCR SERPLBLD CKD-EPI 2021: >90 ML/MIN/1.73M2
EOSINOPHIL # BLD AUTO: 0.1 10E3/UL (ref 0–0.7)
EOSINOPHIL NFR BLD AUTO: 0 %
ERYTHROCYTE [DISTWIDTH] IN BLOOD BY AUTOMATED COUNT: 12.9 % (ref 10–15)
GLUCOSE BLDC GLUCOMTR-MCNC: 153 MG/DL (ref 70–99)
GLUCOSE SERPL-MCNC: 187 MG/DL (ref 70–99)
HCO3 SERPL-SCNC: 23 MMOL/L (ref 22–29)
HCT VFR BLD AUTO: 37.7 % (ref 40–53)
HGB BLD-MCNC: 13 G/DL (ref 13.3–17.7)
HOLD SPECIMEN: NORMAL
IMM GRANULOCYTES # BLD: 0.1 10E3/UL
IMM GRANULOCYTES NFR BLD: 1 %
LYMPHOCYTES # BLD AUTO: 1.2 10E3/UL (ref 0.8–5.3)
LYMPHOCYTES NFR BLD AUTO: 6 %
MAGNESIUM SERPL-MCNC: 2.1 MG/DL (ref 1.7–2.3)
MCH RBC QN AUTO: 29.4 PG (ref 26.5–33)
MCHC RBC AUTO-ENTMCNC: 34.5 G/DL (ref 31.5–36.5)
MCV RBC AUTO: 85 FL (ref 78–100)
MONOCYTES # BLD AUTO: 0.8 10E3/UL (ref 0–1.3)
MONOCYTES NFR BLD AUTO: 4 %
NEUTROPHILS # BLD AUTO: 17.4 10E3/UL (ref 1.6–8.3)
NEUTROPHILS NFR BLD AUTO: 89 %
NRBC # BLD AUTO: 0 10E3/UL
NRBC BLD AUTO-RTO: 0 /100
PHOSPHATE SERPL-MCNC: 2.6 MG/DL (ref 2.5–4.5)
PLATELET # BLD AUTO: 141 10E3/UL (ref 150–450)
POTASSIUM SERPL-SCNC: 4.1 MMOL/L (ref 3.4–5.3)
RBC # BLD AUTO: 4.42 10E6/UL (ref 4.4–5.9)
SODIUM SERPL-SCNC: 138 MMOL/L (ref 135–145)
WBC # BLD AUTO: 19.6 10E3/UL (ref 4–11)

## 2025-04-14 PROCEDURE — 250N000011 HC RX IP 250 OP 636: Mod: JZ | Performed by: SURGERY

## 2025-04-14 PROCEDURE — 85025 COMPLETE CBC W/AUTO DIFF WBC: CPT | Performed by: SURGERY

## 2025-04-14 PROCEDURE — 250N000013 HC RX MED GY IP 250 OP 250 PS 637: Performed by: SURGERY

## 2025-04-14 PROCEDURE — 84100 ASSAY OF PHOSPHORUS: CPT | Performed by: SURGERY

## 2025-04-14 PROCEDURE — 83735 ASSAY OF MAGNESIUM: CPT | Performed by: SURGERY

## 2025-04-14 PROCEDURE — 36415 COLL VENOUS BLD VENIPUNCTURE: CPT | Performed by: SURGERY

## 2025-04-14 PROCEDURE — 258N000003 HC RX IP 258 OP 636: Performed by: SURGERY

## 2025-04-14 PROCEDURE — 82565 ASSAY OF CREATININE: CPT | Performed by: SURGERY

## 2025-04-14 RX ORDER — OXYCODONE AND ACETAMINOPHEN 5; 325 MG/1; MG/1
1 TABLET ORAL EVERY 6 HOURS PRN
Qty: 12 TABLET | Refills: 0 | Status: SHIPPED | OUTPATIENT
Start: 2025-04-14 | End: 2025-04-17

## 2025-04-14 RX ORDER — IBUPROFEN 200 MG
200 TABLET ORAL EVERY 4 HOURS PRN
COMMUNITY

## 2025-04-14 RX ORDER — METRONIDAZOLE 500 MG/1
500 TABLET ORAL 2 TIMES DAILY
Qty: 6 TABLET | Refills: 0 | Status: SHIPPED | OUTPATIENT
Start: 2025-04-14 | End: 2025-04-17

## 2025-04-14 RX ORDER — SENNA AND DOCUSATE SODIUM 50; 8.6 MG/1; MG/1
1 TABLET, FILM COATED ORAL AT BEDTIME
Qty: 20 TABLET | Refills: 0 | Status: SHIPPED | OUTPATIENT
Start: 2025-04-14

## 2025-04-14 RX ORDER — CIPROFLOXACIN 500 MG/1
500 TABLET, FILM COATED ORAL 2 TIMES DAILY
Qty: 6 TABLET | Refills: 0 | Status: SHIPPED | OUTPATIENT
Start: 2025-04-14 | End: 2025-04-17

## 2025-04-14 RX ADMIN — SENNOSIDES AND DOCUSATE SODIUM 1 TABLET: 50; 8.6 TABLET ORAL at 10:19

## 2025-04-14 RX ADMIN — METRONIDAZOLE 500 MG: 500 INJECTION, SOLUTION INTRAVENOUS at 08:14

## 2025-04-14 RX ADMIN — CIPROFLOXACIN 400 MG: 400 INJECTION, SOLUTION INTRAVENOUS at 05:58

## 2025-04-14 RX ADMIN — ACETAMINOPHEN 975 MG: 325 TABLET, FILM COATED ORAL at 03:23

## 2025-04-14 RX ADMIN — SODIUM CHLORIDE, POTASSIUM CHLORIDE, SODIUM LACTATE AND CALCIUM CHLORIDE: 600; 310; 30; 20 INJECTION, SOLUTION INTRAVENOUS at 03:23

## 2025-04-14 ASSESSMENT — ACTIVITIES OF DAILY LIVING (ADL)
ADLS_ACUITY_SCORE: 16
ADLS_ACUITY_SCORE: 22
ADLS_ACUITY_SCORE: 16
ADLS_ACUITY_SCORE: 22
ADLS_ACUITY_SCORE: 22
ADLS_ACUITY_SCORE: 16
ADLS_ACUITY_SCORE: 16

## 2025-04-14 NOTE — PLAN OF CARE
VSS, pain controlled with Tylenol. 3 laparoscopic sites to abdomen with liquid bandage open to air. Passing flatus and voiding. Ambulated with SB to bathroom.

## 2025-04-14 NOTE — PLAN OF CARE
Goal Outcome Evaluation:      BP elevated, VSS otherwise, denies pain. LS clear, bowel sounds audible. Patient is tolerating oral intake adequately. Denies N/V. 3 troch sites to abd CDI. Patient is voiding spontaneously.     Plan for patient to discharge home.           Plan of Care Reviewed With: patient    Overall Patient Progress: improvingOverall Patient Progress: improving    Outcome Evaluation: BP elevated, VSS otherwise, denies pain.    Emily Aponte RN 4/14/2025 10:16 AM

## 2025-04-14 NOTE — PLAN OF CARE
Goal Outcome Evaluation:      Plan of Care Reviewed With: patient, spouse    Overall Patient Progress: improvingOverall Patient Progress: improving    Outcome Evaluation: VSS. Afebrile. Denies pain. Diet advanced, Voiding. IS use independently.     Patient alert and oriented. Currently on RA, satting around 94%. Initiated IV antibiotics. Family at bedside. Patient denies pain, cold therapy on abdomen. X3 trocar sites are open to air, surgical glue intact. Patient tolerating regular diet, denies nausea. Patient voiding, encouraging to drink more fluids, urine was concentrated. Family at bedside.

## 2025-04-14 NOTE — PROGRESS NOTES
NS[facility] DISCHARGE NOTE    Patient discharged to home at 11:00 AM via wheel chair. Accompanied by spouse and staff. Discharge instructions reviewed with patient and spouse, opportunity offered to ask questions. Prescriptions sent to patients preferred pharmacy. All belongings sent with patient.    Emily Aponte RN

## 2025-04-14 NOTE — PROGRESS NOTES
" [facility] ADMISSION NOTE    Patient admitted to room 316 at approximately 1745 via bed from surgery. Patient was accompanied by spouse and nurse.     Verbal SBAR report received from PACU RN prior to patient arrival.     Patient trasferred to bed via patient slid over. Patient alert and oriented X 3. The patient is not having any pain.  . Admission vital signs: Blood pressure (!) 143/83, pulse 87, temperature 101.3  F (38.5  C), resp. rate 14, height 1.854 m (6' 1\"), weight 129.3 kg (285 lb), SpO2 95%. Patient and spouse was oriented to plan of care, call light, bed controls, tv, telephone, bathroom, and visiting hours.     Risk Assessment    The following safety risks were identified during admission: fall and skin. Yellow risk band applied: YES.     Skin Initial Assessment    This writer admitted this patient and completed a full skin assessment and Charles score in the Adult PCS flowsheet.   Photo documentation of skin problem and/or wound competed via [employer] application (located under Media):  Yes    Appropriate interventions initiated as needed.         Charles Risk Assessment  Sensory Perception: 3-->slightly limited  Moisture: 3-->occasionally moist  Activity: 3-->walks occasionally  Mobility: 3-->slightly limited  Nutrition: 3-->adequate  Friction and Shear: 3-->no apparent problem  Charles Score: 18  Moisture Interventions: Encourage regular toileting, No brief in bed    Education    Patient has a [place] to Observation order: No  Observation education completed and documented: N/A      Saida Hooks RN      "

## 2025-04-14 NOTE — ANESTHESIA POSTPROCEDURE EVALUATION
Patient: August Mckeon    Procedure: Procedure(s):  APPENDECTOMY, LAPAROSCOPIC       Anesthesia Type:  General    Note:  Disposition: Outpatient   Postop Pain Control: Uneventful            Sign Out: Well controlled pain   PONV: No   Neuro/Psych: Uneventful            Sign Out: Acceptable/Baseline neuro status   Airway/Respiratory: Uneventful            Sign Out: Acceptable/Baseline resp. status   CV/Hemodynamics: Uneventful            Sign Out: Acceptable CV status; No obvious hypovolemia; No obvious fluid overload   Other NRE: NONE   DID A NON-ROUTINE EVENT OCCUR? No           Last vitals:  Vitals Value Taken Time   /67 04/13/25 1715   Temp 101.1  F (38.4  C) 04/13/25 1715   Pulse 85 04/13/25 1715   Resp 9 04/13/25 1716   SpO2 94 % 04/13/25 1715   Vitals shown include unfiled device data.    Electronically Signed By: LIANNA Winkler CRNA  April 13, 2025  7:15 PM

## 2025-04-14 NOTE — PHARMACY-ADMISSION MEDICATION HISTORY
Pharmacy Intern Admission Medication History    Admission medication history is complete. The information provided in this note is only as accurate as the sources available at the time of the update.    Information Source(s): Patient and CareEverywhere/SureScripts via in-person    Pertinent Information: Patient took 3 tablets ibuprofen Saturday night. No other vitamin, supplements, OTC use.    Changes made to PTA medication list:  Added: ibuprofen 200 mg PRN pain  Deleted: clonazepam 0.5 mg tablets BID PRN anxiety  Changed: None    Allergies reviewed with patient and updates made in EHR: yes    Medication History Completed By: Nasrin Jacobsen 4/14/2025 8:13 AM    PTA Med List   Medication Sig Last Dose/Taking    ibuprofen (ADVIL/MOTRIN) 200 MG tablet Take 200 mg by mouth every 4 hours as needed for pain. 4/12/2025 Bedtime

## 2025-04-14 NOTE — PHARMACY - DISCHARGE MEDICATION RECONCILIATION AND EDUCATION
Pharmacy:  Discharge Counseling and Medication Reconciliation    August Mckeon  [address] [facility] [id number] [phone] (home)   44 year old male  PCP: No Ref-Primary, Physician    Allergies: Patient has no known allergies.    Discharge Counseling:    Pharmacist met with patient (and/or family) today to review the medication portion of the After Visit Summary (with an emphasis on NEW medications) and to address patient's questions/concerns.    Summary of Education: Discussed the appropriate use of oral antibiotics with patient. Such as the importance of finishing the entire course and how to manage potential side effects such as diarrhea with OTC medications and to be on the lookout of intense unusual muscle pain. Patient is aware to space medication from pills containing calcium, iron, mag, zinc d/t decreased absorption and that sun sensitivity may occur. Patient educated that they may take with/without food but should avoid dairy products near administration. Discussed Flagyl and to avoid EtOH for duration of Abx and up to 48 hours after. Dareny discussed Narcotic use patient attested no narcotics would likely be needed and would be disposing at [facility] pharmacy. Edcuated on stool softener to reduce straining.        Materials Provided:  MedCounselor sheets printed from Clinical Pharmacology on: Senna-doc, oxycodone, Flagyl, Ciprofloxacin     Discharge Medication Reconciliation:    It has been determined that the patient has an adequate supply of medications available or which can be obtained from the patient's preferred pharmacy, which HE/SHE has confirmed as: [facility]     Thank you for the consult.    Maxwell Aguirre [facility]........April 14, 2025 2:18 PM

## 2025-04-14 NOTE — PROGRESS NOTES
SAFETY CHECKLIST  ID Bands and Risk clasps correct and in place (DNR, Fall risk, Allergy, Latex, Limb):  Yes  All Lines Reconciled and labeled correctly: Yes  Whiteboard updated:Yes  Environmental interventions: Yes  Verify Tele #:      Emily Aponte RN 4/14/2025 8:05 AM

## 2025-04-15 ENCOUNTER — PATIENT OUTREACH (OUTPATIENT)
Dept: FAMILY MEDICINE | Facility: OTHER | Age: 45
End: 2025-04-15
Payer: COMMERCIAL

## 2025-04-15 NOTE — TELEPHONE ENCOUNTER
Surgical. Patient discharged with surgical follow-up only. No TCM call required per policy.   Isabella Moore RN on 4/15/2025 at 8:57 AM

## 2025-04-16 LAB
PATH REPORT.COMMENTS IMP SPEC: NORMAL
PATH REPORT.FINAL DX SPEC: NORMAL
PATH REPORT.RELEVANT HX SPEC: NORMAL
PHOTO IMAGE: NORMAL

## 2025-04-17 ENCOUNTER — TELEPHONE (OUTPATIENT)
Dept: SURGERY | Facility: OTHER | Age: 45
End: 2025-04-17
Payer: COMMERCIAL

## 2025-04-17 LAB
BACTERIA BLD CULT: NORMAL
BACTERIA BLD CULT: NORMAL

## 2025-04-17 NOTE — TELEPHONE ENCOUNTER
Patient is returning a missed call he just received today from a nurse regarding his test results.    Collette R. Korva on 4/17/2025 at 10:14 AM

## 2025-04-18 LAB
BACTERIA BLD CULT: NO GROWTH
BACTERIA BLD CULT: NO GROWTH

## 2025-04-26 ENCOUNTER — HEALTH MAINTENANCE LETTER (OUTPATIENT)
Age: 45
End: 2025-04-26

## 2025-04-28 ENCOUNTER — OFFICE VISIT (OUTPATIENT)
Dept: SURGERY | Facility: OTHER | Age: 45
End: 2025-04-28
Attending: SURGERY
Payer: COMMERCIAL

## 2025-04-28 ENCOUNTER — HOSPITAL ENCOUNTER (OUTPATIENT)
Dept: CT IMAGING | Facility: OTHER | Age: 45
Discharge: HOME | End: 2025-04-28
Attending: SURGERY | Admitting: RADIOLOGY
Payer: COMMERCIAL

## 2025-04-28 VITALS
BODY MASS INDEX: 41.75 KG/M2 | RESPIRATION RATE: 16 BRPM | WEIGHT: 315 LBS | SYSTOLIC BLOOD PRESSURE: 138 MMHG | OXYGEN SATURATION: 97 % | HEART RATE: 86 BPM | HEIGHT: 73 IN | TEMPERATURE: 99.5 F | DIASTOLIC BLOOD PRESSURE: 88 MMHG

## 2025-04-28 DIAGNOSIS — R35.0 URINARY FREQUENCY: ICD-10-CM

## 2025-04-28 DIAGNOSIS — G89.18 POST-OPERATIVE PAIN: ICD-10-CM

## 2025-04-28 DIAGNOSIS — G89.18 POST-OPERATIVE PAIN: Primary | ICD-10-CM

## 2025-04-28 DIAGNOSIS — L02.91 ABSCESS: ICD-10-CM

## 2025-04-28 LAB
ALBUMIN UR-MCNC: 50 MG/DL
ANION GAP SERPL CALCULATED.3IONS-SCNC: 12 MMOL/L (ref 7–15)
APPEARANCE UR: CLEAR
BILIRUB UR QL STRIP: NEGATIVE
BUN SERPL-MCNC: 13.9 MG/DL (ref 6–20)
CALCIUM SERPL-MCNC: 9.7 MG/DL (ref 8.8–10.4)
CHLORIDE SERPL-SCNC: 98 MMOL/L (ref 98–107)
COLOR UR AUTO: YELLOW
CREAT SERPL-MCNC: 1.08 MG/DL (ref 0.67–1.17)
EGFRCR SERPLBLD CKD-EPI 2021: 86 ML/MIN/1.73M2
ERYTHROCYTE [DISTWIDTH] IN BLOOD BY AUTOMATED COUNT: 12.5 % (ref 10–15)
GLUCOSE SERPL-MCNC: 108 MG/DL (ref 70–99)
GLUCOSE UR STRIP-MCNC: NEGATIVE MG/DL
HCO3 SERPL-SCNC: 26 MMOL/L (ref 22–29)
HCT VFR BLD AUTO: 42.9 % (ref 40–53)
HGB BLD-MCNC: 14.4 G/DL (ref 13.3–17.7)
HGB UR QL STRIP: ABNORMAL
HYALINE CASTS: 1 /LPF
KETONES UR STRIP-MCNC: NEGATIVE MG/DL
LEUKOCYTE ESTERASE UR QL STRIP: NEGATIVE
MCH RBC QN AUTO: 28.3 PG (ref 26.5–33)
MCHC RBC AUTO-ENTMCNC: 33.6 G/DL (ref 31.5–36.5)
MCV RBC AUTO: 84 FL (ref 78–100)
MUCOUS THREADS #/AREA URNS LPF: PRESENT /LPF
NITRATE UR QL: NEGATIVE
PH UR STRIP: 5.5 [PH] (ref 5–9)
PLATELET # BLD AUTO: 331 10E3/UL (ref 150–450)
POTASSIUM SERPL-SCNC: 4.6 MMOL/L (ref 3.4–5.3)
RBC # BLD AUTO: 5.08 10E6/UL (ref 4.4–5.9)
RBC URINE: 0 /HPF
SODIUM SERPL-SCNC: 136 MMOL/L (ref 135–145)
SP GR UR STRIP: 1.03 (ref 1–1.03)
UROBILINOGEN UR STRIP-MCNC: NORMAL MG/DL
WBC # BLD AUTO: 19.2 10E3/UL (ref 4–11)
WBC URINE: 1 /HPF

## 2025-04-28 PROCEDURE — 3079F DIAST BP 80-89 MM HG: CPT | Performed by: SURGERY

## 2025-04-28 PROCEDURE — 74177 CT ABD & PELVIS W/CONTRAST: CPT

## 2025-04-28 PROCEDURE — 250N000011 HC RX IP 250 OP 636: Performed by: SURGERY

## 2025-04-28 PROCEDURE — 36415 COLL VENOUS BLD VENIPUNCTURE: CPT | Mod: ZL | Performed by: SURGERY

## 2025-04-28 PROCEDURE — 81001 URINALYSIS AUTO W/SCOPE: CPT | Mod: ZL | Performed by: SURGERY

## 2025-04-28 PROCEDURE — 99024 POSTOP FOLLOW-UP VISIT: CPT | Performed by: SURGERY

## 2025-04-28 PROCEDURE — 1125F AMNT PAIN NOTED PAIN PRSNT: CPT | Performed by: SURGERY

## 2025-04-28 PROCEDURE — 74177 CT ABD & PELVIS W/CONTRAST: CPT | Mod: 26 | Performed by: RADIOLOGY

## 2025-04-28 PROCEDURE — 80048 BASIC METABOLIC PNL TOTAL CA: CPT | Mod: ZL | Performed by: SURGERY

## 2025-04-28 PROCEDURE — 3075F SYST BP GE 130 - 139MM HG: CPT | Performed by: SURGERY

## 2025-04-28 PROCEDURE — 250N000009 HC RX 250: Performed by: SURGERY

## 2025-04-28 PROCEDURE — 85014 HEMATOCRIT: CPT | Mod: ZL | Performed by: SURGERY

## 2025-04-28 RX ORDER — IOPAMIDOL 755 MG/ML
150 INJECTION, SOLUTION INTRAVASCULAR ONCE
Status: COMPLETED | OUTPATIENT
Start: 2025-04-28 | End: 2025-04-28

## 2025-04-28 RX ORDER — CIPROFLOXACIN 500 MG/1
500 TABLET, FILM COATED ORAL 2 TIMES DAILY
Qty: 14 TABLET | Refills: 0 | Status: SHIPPED | OUTPATIENT
Start: 2025-04-28 | End: 2025-05-05

## 2025-04-28 RX ORDER — METRONIDAZOLE 500 MG/1
500 TABLET ORAL 3 TIMES DAILY
Qty: 21 TABLET | Refills: 0 | Status: SHIPPED | OUTPATIENT
Start: 2025-04-28 | End: 2025-05-05

## 2025-04-28 RX ADMIN — SODIUM CHLORIDE 60 ML: 9 INJECTION, SOLUTION INTRAVENOUS at 16:43

## 2025-04-28 RX ADMIN — IOPAMIDOL 150 ML: 755 INJECTION, SOLUTION INTRAVENOUS at 16:41

## 2025-04-28 ASSESSMENT — PAIN SCALES - GENERAL: PAINLEVEL_OUTOF10: MILD PAIN (3)

## 2025-04-28 NOTE — NURSING NOTE
"Chief Complaint   Patient presents with    Post-Op - General Surgery     4-13-25   appendectomy         Medication reconciliation completed.    FOOD SECURITY SCREENING QUESTIONS:    The next two questions are to help us understand your food security.  If you are feeling you need any assistance in this area, we have resources available to support you today.    Hunger Vital Signs:  Within the past 12 months we worried whether our food would run out before we got money to buy more. Never  Within the past 12 months the food we bought just didn't last and we didn't have money to get more. Never    Initial /88 (BP Location: Right arm, Patient Position: Sitting, Cuff Size: Adult Large)   Pulse 86   Temp 99.5  F (37.5  C) (Tympanic)   Resp 16   Ht 1.854 m (6' 1\")   Wt (!) 144.8 kg (319 lb 3.2 oz)   SpO2 97%   BMI 42.11 kg/m   Estimated body mass index is 42.11 kg/m  as calculated from the following:    Height as of this encounter: 1.854 m (6' 1\").    Weight as of this encounter: 144.8 kg (319 lb 3.2 oz).       Oriana Li LPN .......  4/28/2025  10:28 AM    "

## 2025-04-28 NOTE — PROGRESS NOTES
"August Mckeon presents to clinic for follow-up after undergoing laparoscopic appendectomy for perforated appendicitis.  Patient states he is doing generally well.  Slowly getting his energy back and complains of some ongoing right lower quadrant abdominal pain.  States the pain does not seem to be getting any better but is not getting tremendously worse.  Tolerating regular diet, bowel movements are normal.  No problems with the incisions.      /88 (BP Location: Right arm, Patient Position: Sitting, Cuff Size: Adult Large)   Pulse 86   Temp 99.5  F (37.5  C) (Tympanic)   Resp 16   Ht 1.854 m (6' 1\")   Wt (!) 144.8 kg (319 lb 3.2 oz)   SpO2 97%   BMI 42.11 kg/m      Abdomen is soft, mildly distended.  Pain to palpation with very slight guarding in the right lower quadrant.  Incisions are clean, dry, intact.  Some mild, resolving ecchymosis at the umbilical incision      Surgery pathology report shows perforated appendicitis.  No evidence of dysplasia or malignancy.      August Mckeon is a 44-year-old male status post laparoscopic appendectomy.  Generally seems to be recovering well.  But, he has some lingering symptoms which are worrisome for possible postoperative complication including postoperative abscess.  Plan for labs today.  Will address abnormalities with further testing if needed.      ADDENDUM  Labs came back with a white count of 19,000.  Plan for CT scan tomorrow to evaluate the right lower quadrant for possible postoperative infection.  Ciprofloxacin and Flagyl were ordered for the patient to be picked up today.  I called the patient myself to discuss results of his labs and plans for antibiotics and CT scan.  He is in agreement with this plan and will follow-up after his scan tomorrow.    Tyler Puentes MD    "

## 2025-04-29 ENCOUNTER — TELEPHONE (OUTPATIENT)
Dept: CT IMAGING | Facility: OTHER | Age: 45
End: 2025-04-29
Payer: COMMERCIAL

## 2025-04-29 NOTE — TELEPHONE ENCOUNTER
No Known Allergies    Current Outpatient Medications   Medication Sig Dispense Refill    ciprofloxacin (CIPRO) 500 MG tablet Take 1 tablet (500 mg) by mouth 2 times daily for 7 days. 14 tablet 0    ibuprofen (ADVIL/MOTRIN) 200 MG tablet Take 200 mg by mouth every 4 hours as needed for pain. (Patient not taking: Reported on 4/28/2025)      metroNIDAZOLE (FLAGYL) 500 MG tablet Take 1 tablet (500 mg) by mouth 3 times daily for 7 days. 21 tablet 0    SENNA-docusate sodium (SENNA S) 8.6-50 MG tablet Take 1 tablet by mouth at bedtime. (Patient not taking: Reported on 4/28/2025) 20 tablet 0     No current facility-administered medications for this visit.       Any anticoagulants or blood thinners? No, None.  If yes, patient was instructed to follow MD orders for stopping anticoagulants or blood thinners (see nurse handbook for [facility] and Rayus blood thinner hold recommendations).     Any insulin or oral antihyperglycemic meds? No. If so, hold according to policy day of procedure (typically if NPO after MN, hold am diabetic medications, restart after the procedure; for noon procedures, may have early breakfast and take am diabetic medications).    Any injectable medications such as ozempic, victoza, saxenda, wegovy, byetta, bydureon, trulicity, rybelsus, liraglutide, semaglutide, dulaglutide? No. For weekly injectables, hold for 1 week prior to procedure. For daily injectables or orals, hold time is the same as the frequency it is taken (ex. daily meds hold for 1 day prior, q 5 day meds hold 5 days prior). These medications cause delayed gastric emptying.    Are you being treated for an infection (on antibiotics)? Yes. If yes, discuss with radiologist about when it is appropriate to schedule radiology procedure.    Procedure name: post op abscess drain placement  (If CT procedure, inform Maya in CT of this procedure)  Procedure date verified: Yes, 4/30/25.  Arrival time verified: 0830 AM Please disregard any automatic or  "other arrival time instructions. This is the accurate time for arrival.  Facility location verified: [address], [place], [place]. Instructed to enter through main clinic entrance, veer to the right, go down long carpeted hallway, and proceed to Diagnostic Registration.  Plan to be here for approximately 3-3.5 hours. Explain current visitor policy to patient.    COVID test: no longer required for outpatient procedures.    Pre-procedure optimization completed within 30 days. Lab results were reviewed by this writer and are WNL or any abnormal labs discussed with radiologist. CBC RESULTS:   Recent Labs   Lab Test 04/28/25  1050   WBC 19.2*   RBC 5.08   HGB 14.4   HCT 42.9   MCV 84   MCH 28.3   MCHC 33.6   RDW 12.5      , No results found for: \"INR\"   Any mention of previous ASA class in chart (use search tool)? 2 - Mild systemic disease (if no mention of ASA AND there are co-morbidities, call [phone] for CRNA chart review.  HARD STOP FOR ASA IV, or V. No moderate sedation outside of OR for ASA IV, or V. Inform Dr. Ratliff of ASA III, IV, or V at time of this note (may use narcotic OR sedative independently). Radiologist or nurse can consult with CRNA if any questions regarding ASA status.     1. Have you ever had a heart attack or stroke? no  2. Have you ever had surgery on your heart or blood vessels, such as a stent placement, a coronary artery bypass, or surgery on an artery in your head, neck, heart, or legs? no  3. Do you have chest pain with activity? no  4. Do you have a history of  heart failure? no  5. Do you currently have a cold, bronchitis or symptoms of other infection? no  6. Do you have a asthma, a cough, shortness of breath, or wheezing? no  7. Do you or does anyone in your family have a serious bleeding problem such as prolonged bleeding following surgeries or cuts? no  8. Have you or any of your relatives ever had problems with anesthesia? no  9. Do you have sleep apnea, " excessive snoring or daytime drowsiness? no  10. Do you have any artifical heart valves or other implanted medical devices like a stents, pacemaker, defibrillator, or continuous glucose monitor? no  11. Do you have artificial joints? no  12. Have you ever been intubated or had respiratory failure? no  13. Are you diabetic? no  14. Do you have high blood pressure? no  15. How many beverages containing alcohol do you have daily? no  16. Are you on dialysis? no  17. Do you have dentures? no  18. Have you ever had surgery on your neck? no  19. Do you take pain pills? no If yes, how many do you take each day? N/a    CRNA consulted? No    Will moderate sedation be used for this case? Yes    CPAP machine use: No (If yes, instructed to bring CPAP to all procedures using moderate sedation)    No solids after MN AM. (6 hours prior to start of procedure.)  No liquids after 0600 AM. (3 hours prior to start of procedure.)    Transportation after procedure: Yes: will arrange a ride after procedure. Someone must stay with the patient for 24 hours.     Any questions or patient concerns? Yes: all questions answered.     Provide patient with DI nurse number [phone].

## 2025-04-30 ENCOUNTER — HOSPITAL ENCOUNTER (OUTPATIENT)
Dept: CT IMAGING | Facility: OTHER | Age: 45
Discharge: HOME | End: 2025-04-30
Attending: SURGERY
Payer: COMMERCIAL

## 2025-04-30 VITALS
DIASTOLIC BLOOD PRESSURE: 85 MMHG | RESPIRATION RATE: 16 BRPM | TEMPERATURE: 98.7 F | HEART RATE: 81 BPM | SYSTOLIC BLOOD PRESSURE: 144 MMHG | OXYGEN SATURATION: 95 %

## 2025-04-30 DIAGNOSIS — G89.18 POST-OPERATIVE PAIN: ICD-10-CM

## 2025-04-30 DIAGNOSIS — L02.91 ABSCESS: ICD-10-CM

## 2025-04-30 PROCEDURE — 250N000009 HC RX 250: Performed by: RADIOLOGY

## 2025-04-30 PROCEDURE — 49406 IMAGE CATH FLUID PERI/RETRO: CPT

## 2025-04-30 PROCEDURE — 250N000011 HC RX IP 250 OP 636: Performed by: RADIOLOGY

## 2025-04-30 RX ORDER — NALOXONE HYDROCHLORIDE 0.4 MG/ML
0.2 INJECTION, SOLUTION INTRAMUSCULAR; INTRAVENOUS; SUBCUTANEOUS
Status: DISCONTINUED | OUTPATIENT
Start: 2025-04-30 | End: 2025-05-01 | Stop reason: HOSPADM

## 2025-04-30 RX ORDER — LIDOCAINE 40 MG/G
CREAM TOPICAL
OUTPATIENT
Start: 2025-04-30

## 2025-04-30 RX ORDER — FLUMAZENIL 0.1 MG/ML
0.2 INJECTION, SOLUTION INTRAVENOUS
Status: DISCONTINUED | OUTPATIENT
Start: 2025-04-30 | End: 2025-05-01 | Stop reason: HOSPADM

## 2025-04-30 RX ORDER — NALOXONE HYDROCHLORIDE 0.4 MG/ML
0.4 INJECTION, SOLUTION INTRAMUSCULAR; INTRAVENOUS; SUBCUTANEOUS
Status: DISCONTINUED | OUTPATIENT
Start: 2025-04-30 | End: 2025-05-01 | Stop reason: HOSPADM

## 2025-04-30 RX ORDER — FENTANYL CITRATE 50 UG/ML
25-50 INJECTION, SOLUTION INTRAMUSCULAR; INTRAVENOUS EVERY 5 MIN PRN
Status: DISCONTINUED | OUTPATIENT
Start: 2025-04-30 | End: 2025-05-01 | Stop reason: HOSPADM

## 2025-04-30 RX ADMIN — MIDAZOLAM 0.5 MG: 1 INJECTION INTRAMUSCULAR; INTRAVENOUS at 09:31

## 2025-04-30 RX ADMIN — FENTANYL CITRATE 50 MCG: 50 INJECTION INTRAMUSCULAR; INTRAVENOUS at 09:31

## 2025-04-30 RX ADMIN — LIDOCAINE HYDROCHLORIDE 10 ML: 10 INJECTION, SOLUTION INFILTRATION; PERINEURAL at 09:36

## 2025-04-30 NOTE — PROGRESS NOTES
" No Known Allergies    Current Outpatient Medications   Medication Sig Dispense Refill    ciprofloxacin (CIPRO) 500 MG tablet Take 1 tablet (500 mg) by mouth 2 times daily for 7 days. 14 tablet 0    ibuprofen (ADVIL/MOTRIN) 200 MG tablet Take 200 mg by mouth every 4 hours as needed for pain. (Patient not taking: Reported on 4/28/2025)      metroNIDAZOLE (FLAGYL) 500 MG tablet Take 1 tablet (500 mg) by mouth 3 times daily for 7 days. 21 tablet 0    SENNA-docusate sodium (SENNA S) 8.6-50 MG tablet Take 1 tablet by mouth at bedtime. (Patient not taking: Reported on 4/28/2025) 20 tablet 0     Current Facility-Administered Medications   Medication Dose Route Frequency Provider Last Rate Last Admin    fentaNYL (PF) (SUBLIMAZE) injection 25-50 mcg  25-50 mcg Intravenous Q5 Min PRN Roland Ratliff MD        flumazenil (ROMAZICON) injection 0.2 mg  0.2 mg Intravenous q1 min prn Rolnad Ratliff MD        lidocaine 1 % 10 mL  10 mL Infiltration Once PRN Roland Ratliff MD        midazolam (VERSED) injection 0.5-2 mg  0.5-2 mg Intravenous Q4 Min PRN Roland Ratliff MD        naloxone (NARCAN) injection 0.2 mg  0.2 mg Intravenous Q2 Min PRN Roland Ratliff MD        Or    naloxone (NARCAN) injection 0.4 mg  0.4 mg Intravenous Q2 Min PRN Roland Ratliff MD        Or    naloxone (NARCAN) injection 0.2 mg  0.2 mg Intramuscular Q2 Min PRN Roland Ratliff MD        Or    naloxone (NARCAN) injection 0.4 mg  0.4 mg Intramuscular Q2 Min PRN Roland Ratliff MD           Pain pre-procedure: 0/10, only some minor discomfort if he moves in a position where he does a \"sit-up\".    Post Procedure Summary:    Prior to the start of the procedure, with procedural staff and physician participation, I verbally confirmed the patient s identity using two indicators, relevant allergies, that the procedure was appropriate and matched the consent or emergent situation, and that the correct equipment/implants were " available. Immediately prior to starting the procedure I conducted the Time Out with the procedural staff and re-confirmed the patient s name, procedure, and site/side. (The Joint Commission universal protocol was followed.)  Yes       Sedatives: Fentanyl 50 mcg and versed 0.5 mg.     Reversals given: None (If reversals given, must recover for at least 2 hours)    Vital signs, airway and pulse oximetry, capnography, and cardiac rhythm were monitored and remained stable throughout the procedure and sedation was maintained until the procedure was complete.  The patient was monitored by staff until sedation discharge criteria were met.    Patient tolerance: Patient tolerated the procedure well with no immediate complications.    Sedation Start Time: 0930  Sedation End Time: 0950  Time of sedation in minutes: 20 minutes from beginning to end of physician one to one monitoring.    Aspirate: thin tan purulent liquid intially, then thin serosanguinous purulent liquid,  15 mL in total. 8 cm of external tubing from site to white part of tubing.    Dressing: right lateral inferior abdomen     August will be discharged via ride by  wife Nora .     Patient and person driving patient home verbalized understanding of discharge instructions and recommended follow up care as noted on discharge instructions.  Written discharge instructions given, denies any further questions. Belongings sent with patient.     Pain Level at discharge: 0/10

## 2025-05-01 ENCOUNTER — TELEPHONE (OUTPATIENT)
Dept: SURGERY | Facility: OTHER | Age: 45
End: 2025-05-01
Payer: COMMERCIAL

## 2025-05-01 NOTE — TELEPHONE ENCOUNTER
Patient is unable to come in today.  Will wait for appointment on Monday.  Advised patient to be seen earlier if any other issues arrise or call back with any questions or concerns. Young Hampton RN on 5/1/2025 at 12:27 PM

## 2025-05-01 NOTE — TELEPHONE ENCOUNTER
Left message to call back.  Dr Bernard would like to see him this am in regards to his drain.  Young Hampton RN on 5/1/2025 at 9:34 AM

## 2025-05-01 NOTE — TELEPHONE ENCOUNTER
Patient set up with post op appointment with Dr Puentes on 5/5.  Patient was concerned that his drain was not producing any drainage this am.  Stated that he noticed an air bubble this am and it has not drained since.  Young Hampton RN on 5/1/2025 at 9:32 AM

## 2025-05-05 ENCOUNTER — OFFICE VISIT (OUTPATIENT)
Dept: SURGERY | Facility: OTHER | Age: 45
End: 2025-05-05
Attending: SURGERY
Payer: COMMERCIAL

## 2025-05-05 VITALS
BODY MASS INDEX: 41.75 KG/M2 | RESPIRATION RATE: 16 BRPM | SYSTOLIC BLOOD PRESSURE: 138 MMHG | HEART RATE: 72 BPM | DIASTOLIC BLOOD PRESSURE: 72 MMHG | OXYGEN SATURATION: 95 % | TEMPERATURE: 97.9 F | WEIGHT: 315 LBS | HEIGHT: 73 IN

## 2025-05-05 DIAGNOSIS — L02.91 ABSCESS: Primary | ICD-10-CM

## 2025-05-05 DIAGNOSIS — G89.18 POST-OPERATIVE PAIN: ICD-10-CM

## 2025-05-05 LAB
ERYTHROCYTE [DISTWIDTH] IN BLOOD BY AUTOMATED COUNT: 12.3 % (ref 10–15)
HCT VFR BLD AUTO: 39.9 % (ref 40–53)
HGB BLD-MCNC: 13.5 G/DL (ref 13.3–17.7)
MCH RBC QN AUTO: 28.2 PG (ref 26.5–33)
MCHC RBC AUTO-ENTMCNC: 33.8 G/DL (ref 31.5–36.5)
MCV RBC AUTO: 84 FL (ref 78–100)
PLATELET # BLD AUTO: 348 10E3/UL (ref 150–450)
RBC # BLD AUTO: 4.78 10E6/UL (ref 4.4–5.9)
WBC # BLD AUTO: 8.2 10E3/UL (ref 4–11)

## 2025-05-05 PROCEDURE — 3078F DIAST BP <80 MM HG: CPT | Performed by: SURGERY

## 2025-05-05 PROCEDURE — 85027 COMPLETE CBC AUTOMATED: CPT | Mod: ZL | Performed by: SURGERY

## 2025-05-05 PROCEDURE — 36415 COLL VENOUS BLD VENIPUNCTURE: CPT | Mod: ZL | Performed by: SURGERY

## 2025-05-05 PROCEDURE — 1126F AMNT PAIN NOTED NONE PRSNT: CPT | Performed by: SURGERY

## 2025-05-05 PROCEDURE — 99024 POSTOP FOLLOW-UP VISIT: CPT | Performed by: SURGERY

## 2025-05-05 PROCEDURE — 3075F SYST BP GE 130 - 139MM HG: CPT | Performed by: SURGERY

## 2025-05-05 RX ORDER — CIPROFLOXACIN 500 MG/1
500 TABLET, FILM COATED ORAL 2 TIMES DAILY
Qty: 14 TABLET | Refills: 0 | Status: SHIPPED | OUTPATIENT
Start: 2025-05-05

## 2025-05-05 RX ORDER — METRONIDAZOLE 500 MG/1
500 TABLET ORAL 3 TIMES DAILY
Qty: 21 TABLET | Refills: 0 | Status: SHIPPED | OUTPATIENT
Start: 2025-05-05

## 2025-05-05 ASSESSMENT — PAIN SCALES - GENERAL: PAINLEVEL_OUTOF10: NO PAIN (0)

## 2025-05-05 NOTE — PROGRESS NOTES
"August Mckeon presents to clinic for follow-up after undergoing laparoscopic appendectomy for perforated appendicitis complicated by postoperative abscess.  He is now status post percutaneous drainage and he feels well.  Some discomfort at the drain site.  However, he denies the deep discomfort from the abscess.  Also, states his energy is better, overall feels like he is improving.    /72 (BP Location: Right arm, Patient Position: Sitting, Cuff Size: Adult Large)   Pulse 72   Temp 97.9  F (36.6  C) (Tympanic)   Resp 16   Ht 1.854 m (6' 1\")   Wt (!) 144.2 kg (318 lb)   SpO2 95%   BMI 41.96 kg/m      Abdomen is soft, mildly distended.  Incisions are clean, dry, intact.  Some blistering around the drain site, suspect adhesive reaction scant, clear drainage in the bulb..       CBC today shows white count of 8      August Mckeon is a 44-year-old male status post laparoscopic appendectomy for perforated appendicitis complicated by postoperative abscess.  He is status post percutaneous drainage and doing well.  White count has normalized and the drain is removed in clinic today.  Will plan to continue oral antibiotics for 1 week and follow-up in surgery clinic as needed.      Tyler Puentes MD    "

## 2025-05-12 NOTE — DISCHARGE SUMMARY
[facility] Discharge Summary    August Mckeon MRN# [id number]   Age: 44 year old YOB: 1980     Date of Admission:  4/13/2025  Date of Discharge::  4/14/2025 11:34 AM  Admitting Physician:  Tyler Puentes MD  Discharge Physician:  Tyler Puentes MD     Home clinic: [facility]          Admission Diagnoses:   Fatty liver [K76.0]  Left adrenal mass [E27.8]  Ruptured appendicitis [K35.32]  Sepsis (H) [A41.9]          Discharge Diagnosis:     Perforated appendicitis          Procedures:     Procedure(s): Laparoscopic appendectomy                   Medications Prior to Admission:     No medications prior to admission.             Discharge Medications:     Discharge Medication List as of 4/14/2025 10:46 AM        START taking these medications    Details   ciprofloxacin (CIPRO) 500 MG tablet Take 1 tablet (500 mg) by mouth 2 times daily for 3 days., Disp-6 tablet, R-0, E-Prescribe      metroNIDAZOLE (FLAGYL) 500 MG tablet Take 1 tablet (500 mg) by mouth 2 times daily for 3 days., Disp-6 tablet, R-0, E-Prescribe      oxyCODONE-acetaminophen (PERCOCET) 5-325 MG tablet Take 1 tablet by mouth every 6 hours as needed for pain., Disp-12 tablet, R-0, E-Prescribe      SENNA-docusate sodium (SENNA S) 8.6-50 MG tablet Take 1 tablet by mouth at bedtime., Disp-20 tablet, R-0, E-Prescribe           CONTINUE these medications which have NOT CHANGED    Details   ibuprofen (ADVIL/MOTRIN) 200 MG tablet Take 200 mg by mouth every 4 hours as needed for pain., Historical                   Consultations:   No consultations were requested during this admission          Brief History of Illness:   Reason for admission requiring a surgical or invasive procedure:   Ruptured appendicitis [K35.32]   The patient underwent the following procedure(s):   Appendectomy   There were no immediate complications during this procedure.    Please refer to the full operative summary for details.              Hospital Course:   The patient's hospital course was unremarkable.  He recovered as anticipated and was discharged in good condition on POD#1.           Discharge Instructions and Follow-Up:     Discharge diet: Regular   Discharge activity: Activity as tolerated   Discharge follow-up: Follow up with Dr. Puentes in 10-14 days   Wound care: Keep wound clean and dry           Discharge Disposition:     Discharged to home      Attestation:  I have reviewed today's vital signs, notes, medications, labs and imaging.  Amount of time performed on this discharge summary: 15 minutes.    Tyler Puentes MD

## (undated) DEVICE — PREP CHLORAPREP 26ML TINTED ORANGE  260815

## (undated) DEVICE — GLOVE PROTEXIS PI ORTHO PF 7.5 2D73HT75

## (undated) DEVICE — VERRES NEEDLE 120MM DISPOSABLE 12/BX

## (undated) DEVICE — SUCTION STRYKERFLOW II 250-070-500

## (undated) DEVICE — ENDO TROCAR SLEEVE KII 5X100MM CTR03

## (undated) DEVICE — Device

## (undated) DEVICE — SU MONOCRYL 4-0 PS-2 27" UND Y426H

## (undated) DEVICE — PACK KNEE ARTHROSCOPY CUSTOM SOP15KAFCA

## (undated) DEVICE — DRSG ADAPTIC 3X3" 2012

## (undated) DEVICE — STPL POWERED ECHELON 45MM PSEE45A

## (undated) DEVICE — ENDO TROCAR FIRST ENTRY KII FIOS Z-THRD 12X100MM CTF73

## (undated) DEVICE — ENDO SCOPE WARMER DUAL LAP TM500D

## (undated) DEVICE — GLOVE BIOGEL PI INDICATOR 8.0 LF 41680

## (undated) DEVICE — SLEEVE COMPRESSION SCD KNEE MED 74022

## (undated) DEVICE — ESU GROUND PAD ADULT W/CORD E7507

## (undated) DEVICE — PACK LAPAROSCOPY LF SBA15LPFCA

## (undated) DEVICE — COVER LIGHT HANDLE LT-F02

## (undated) DEVICE — ENDO TROCAR SLEEVE KII Z-THREADED 05X100MM CTS02

## (undated) DEVICE — PAD FLOOR SURGISAFE 46X40" 84610

## (undated) DEVICE — ENDO POUCH UNIV RETRIEVAL SYSTEM INZII 10MM CD001

## (undated) DEVICE — TUBING ARTHROSCOPY PUMP ARTHREX AR-6410

## (undated) DEVICE — SU DERMABOND ADVANCED .7ML DNX12

## (undated) DEVICE — STPL RELOAD REG TISSUE ECHELON 45 X 3.6MM BLUE GST45B

## (undated) DEVICE — TUBING INSUFFLATOR W/FILTER OLYMPUS WA95005A

## (undated) DEVICE — DRSG ABDOMINAL PAD UNSTERILE 5X9" 9190

## (undated) DEVICE — CAST PADDING 4" COTTON WEBRIL UNSTERILE 9084

## (undated) DEVICE — GLOVE PROTEXIS POWDER FREE SMT 7.5  2D72PT75X

## (undated) DEVICE — DRAPE STERI U 1015

## (undated) DEVICE — SOL WATER 1500ML

## (undated) DEVICE — SU VICRYL 0 UR-6 27" J603H

## (undated) DEVICE — TOURNIQUET SGL  BLADDER 30"X4" BLUE 5921030135

## (undated) DEVICE — SU ETHILON 3-0 FS-1 18" 669H

## (undated) DEVICE — BNDG ELASTIC 4"X5YDS UNSTERILE 6611-40

## (undated) DEVICE — ESU LIGASURE LAPAROSCPC L-HOOK SEALER/DVDR 5MM-44CM LF5644

## (undated) DEVICE — DRSG GAUZE 4X4" 3033

## (undated) DEVICE — SUCTION MANIFOLD NEPTUNE 2 SYS 4 PORT 0702-020-000

## (undated) RX ORDER — PROPOFOL 10 MG/ML
INJECTION, EMULSION INTRAVENOUS
Status: DISPENSED
Start: 2025-04-13

## (undated) RX ORDER — FLUMAZENIL 0.1 MG/ML
INJECTION, SOLUTION INTRAVENOUS
Status: DISPENSED
Start: 2025-04-30

## (undated) RX ORDER — FENTANYL CITRATE 50 UG/ML
INJECTION, SOLUTION INTRAMUSCULAR; INTRAVENOUS
Status: DISPENSED
Start: 2020-09-01

## (undated) RX ORDER — KETOROLAC TROMETHAMINE 30 MG/ML
INJECTION, SOLUTION INTRAMUSCULAR; INTRAVENOUS
Status: DISPENSED
Start: 2020-09-01

## (undated) RX ORDER — ONDANSETRON 2 MG/ML
INJECTION INTRAMUSCULAR; INTRAVENOUS
Status: DISPENSED
Start: 2025-04-13

## (undated) RX ORDER — ACETAMINOPHEN 10 MG/ML
INJECTION, SOLUTION INTRAVENOUS
Status: DISPENSED
Start: 2025-04-13

## (undated) RX ORDER — LIDOCAINE HYDROCHLORIDE 20 MG/ML
INJECTION, SOLUTION EPIDURAL; INFILTRATION; INTRACAUDAL; PERINEURAL
Status: DISPENSED
Start: 2020-09-01

## (undated) RX ORDER — CEFAZOLIN SODIUM IN 0.9 % NACL 3 G/100 ML
INTRAVENOUS SOLUTION, PIGGYBACK (ML) INTRAVENOUS
Status: DISPENSED
Start: 2020-09-01

## (undated) RX ORDER — PROPOFOL 10 MG/ML
INJECTION, EMULSION INTRAVENOUS
Status: DISPENSED
Start: 2020-09-01

## (undated) RX ORDER — BUPIVACAINE HYDROCHLORIDE 5 MG/ML
INJECTION, SOLUTION EPIDURAL; INTRACAUDAL; PERINEURAL
Status: DISPENSED
Start: 2025-04-13

## (undated) RX ORDER — KETOROLAC TROMETHAMINE 30 MG/ML
INJECTION, SOLUTION INTRAMUSCULAR; INTRAVENOUS
Status: DISPENSED
Start: 2025-04-13

## (undated) RX ORDER — PIPERACILLIN SODIUM, TAZOBACTAM SODIUM 4; .5 G/20ML; G/20ML
INJECTION, POWDER, LYOPHILIZED, FOR SOLUTION INTRAVENOUS
Status: DISPENSED
Start: 2025-04-13

## (undated) RX ORDER — DEXAMETHASONE SODIUM PHOSPHATE 4 MG/ML
INJECTION, SOLUTION INTRA-ARTICULAR; INTRALESIONAL; INTRAMUSCULAR; INTRAVENOUS; SOFT TISSUE
Status: DISPENSED
Start: 2025-04-13

## (undated) RX ORDER — ONDANSETRON 2 MG/ML
INJECTION INTRAMUSCULAR; INTRAVENOUS
Status: DISPENSED
Start: 2020-09-01

## (undated) RX ORDER — BUPIVACAINE HYDROCHLORIDE 2.5 MG/ML
INJECTION, SOLUTION EPIDURAL; INFILTRATION; INTRACAUDAL
Status: DISPENSED
Start: 2020-09-01

## (undated) RX ORDER — ACETAMINOPHEN 325 MG/1
TABLET ORAL
Status: DISPENSED
Start: 2025-04-13

## (undated) RX ORDER — FENTANYL CITRATE 50 UG/ML
INJECTION, SOLUTION INTRAMUSCULAR; INTRAVENOUS
Status: DISPENSED
Start: 2025-04-13

## (undated) RX ORDER — LIDOCAINE HYDROCHLORIDE 10 MG/ML
INJECTION, SOLUTION INFILTRATION; PERINEURAL
Status: DISPENSED
Start: 2025-04-30

## (undated) RX ORDER — HYDROMORPHONE HYDROCHLORIDE 1 MG/ML
INJECTION, SOLUTION INTRAMUSCULAR; INTRAVENOUS; SUBCUTANEOUS
Status: DISPENSED
Start: 2025-04-13

## (undated) RX ORDER — FENTANYL CITRATE 50 UG/ML
INJECTION, SOLUTION INTRAMUSCULAR; INTRAVENOUS
Status: DISPENSED
Start: 2025-04-30

## (undated) RX ORDER — NALOXONE HYDROCHLORIDE 0.4 MG/ML
INJECTION, SOLUTION INTRAMUSCULAR; INTRAVENOUS; SUBCUTANEOUS
Status: DISPENSED
Start: 2025-04-30

## (undated) RX ORDER — DEXAMETHASONE SODIUM PHOSPHATE 4 MG/ML
INJECTION, SOLUTION INTRA-ARTICULAR; INTRALESIONAL; INTRAMUSCULAR; INTRAVENOUS; SOFT TISSUE
Status: DISPENSED
Start: 2020-09-01